# Patient Record
Sex: FEMALE | Race: OTHER | Employment: FULL TIME | URBAN - METROPOLITAN AREA
[De-identification: names, ages, dates, MRNs, and addresses within clinical notes are randomized per-mention and may not be internally consistent; named-entity substitution may affect disease eponyms.]

---

## 2017-11-27 ENCOUNTER — HOSPITAL ENCOUNTER (EMERGENCY)
Facility: HOSPITAL | Age: 37
Discharge: HOME | End: 2017-11-27
Attending: EMERGENCY MEDICINE
Payer: MEDICAID

## 2017-11-27 VITALS
DIASTOLIC BLOOD PRESSURE: 70 MMHG | SYSTOLIC BLOOD PRESSURE: 119 MMHG | WEIGHT: 168 LBS | TEMPERATURE: 97 F | BODY MASS INDEX: 26.37 KG/M2 | OXYGEN SATURATION: 100 % | HEIGHT: 67 IN | HEART RATE: 84 BPM | RESPIRATION RATE: 19 BRPM

## 2017-11-27 DIAGNOSIS — L24.4 IRRITANT CONTACT DERMATITIS DUE TO DRUG IN CONTACT WITH SKIN: ICD-10-CM

## 2017-11-27 DIAGNOSIS — J00 OTHER ACUTE RHINITIS: Primary | ICD-10-CM

## 2017-11-27 PROCEDURE — 99282 EMERGENCY DEPT VISIT SF MDM: CPT

## 2017-11-27 RX ORDER — FLUTICASONE PROPIONATE 50 MCG
1 SPRAY, SUSPENSION (ML) NASAL 2 TIMES DAILY
Qty: 16 G | Refills: 0 | Status: SHIPPED | OUTPATIENT
Start: 2017-11-27 | End: 2018-11-01

## 2017-11-27 RX ORDER — DIAPER,BRIEF,INFANT-TODD,DISP
1 EACH MISCELLANEOUS DAILY
Qty: 28.4 G | Refills: 0 | Status: SHIPPED | OUTPATIENT
Start: 2017-11-27 | End: 2017-11-29

## 2017-11-27 NOTE — ED INITIAL ASSESSMENT (HPI)
Cough and nasal congestion x3 days, woke this AM with redness and swelling around nose. Denies difficulty breathing; pt speaking in complete sentences. Reports fevers yesterday.

## 2017-11-27 NOTE — ED PROVIDER NOTES
Patient Seen in: [facility] AND [facility] Emergency Department    History   Patient presents with:  Cough    Stated Complaint: cold    HPI    Healthy 49-year-old female with several days of URI symptoms nasal congestion using topical Afrin he applied the extern Clear breath sounds. Abdominal: Soft. There is no tenderness. There is no guarding. Musculoskeletal: Normal range of motion. No edema or tenderness. Neurological: Alert and oriented to person, place, and time. Skin: Skin is warm and dry.    Psychiatr

## 2018-02-02 ENCOUNTER — APPOINTMENT (OUTPATIENT)
Dept: CT IMAGING | Facility: HOSPITAL | Age: 38
End: 2018-02-02
Attending: NURSE PRACTITIONER
Payer: MEDICAID

## 2018-02-02 ENCOUNTER — HOSPITAL ENCOUNTER (EMERGENCY)
Facility: HOSPITAL | Age: 38
Discharge: HOME | End: 2018-02-02
Payer: MEDICAID

## 2018-02-02 VITALS
WEIGHT: 162 LBS | SYSTOLIC BLOOD PRESSURE: 114 MMHG | TEMPERATURE: 98 F | OXYGEN SATURATION: 100 % | HEIGHT: 67 IN | HEART RATE: 76 BPM | RESPIRATION RATE: 18 BRPM | BODY MASS INDEX: 25.43 KG/M2 | DIASTOLIC BLOOD PRESSURE: 81 MMHG

## 2018-02-02 DIAGNOSIS — M54.9 MILD BACK PAIN: ICD-10-CM

## 2018-02-02 DIAGNOSIS — K04.7 DENTAL ABSCESS: Primary | ICD-10-CM

## 2018-02-02 LAB
ALBUMIN SERPL BCP-MCNC: 4 G/DL (ref 3.5–4.8)
ALP SERPL-CCNC: 60 U/L (ref 32–100)
ALT SERPL-CCNC: 14 U/L (ref 14–54)
ANION GAP SERPL CALC-SCNC: 10 MMOL/L (ref 0–18)
AST SERPL-CCNC: 20 U/L (ref 15–41)
B-HCG UR QL: NEGATIVE
BASOPHILS # BLD: 0 K/UL (ref 0–0.2)
BASOPHILS NFR BLD: 1 %
BILIRUB DIRECT SERPL-MCNC: 0 MG/DL (ref 0–0.2)
BILIRUB SERPL-MCNC: 0.5 MG/DL (ref 0.3–1.2)
BILIRUB UR QL: NEGATIVE
BUN SERPL-MCNC: 8 MG/DL (ref 8–20)
BUN/CREAT SERPL: 13.3 (ref 10–20)
CALCIUM SERPL-MCNC: 8.5 MG/DL (ref 8.5–10.5)
CHLORIDE SERPL-SCNC: 105 MMOL/L (ref 95–110)
CLARITY UR: CLEAR
CO2 SERPL-SCNC: 22 MMOL/L (ref 22–32)
COLOR UR: YELLOW
CREAT SERPL-MCNC: 0.6 MG/DL (ref 0.5–1.5)
EOSINOPHIL # BLD: 0.1 K/UL (ref 0–0.7)
EOSINOPHIL NFR BLD: 1 %
ERYTHROCYTE [DISTWIDTH] IN BLOOD BY AUTOMATED COUNT: 19.3 % (ref 11–15)
GLUCOSE SERPL-MCNC: 91 MG/DL (ref 70–99)
GLUCOSE UR-MCNC: NEGATIVE MG/DL
HCT VFR BLD AUTO: 26.7 % (ref 35–48)
HGB BLD-MCNC: 8 G/DL (ref 12–16)
HGB UR QL STRIP.AUTO: NEGATIVE
KETONES UR-MCNC: NEGATIVE MG/DL
LEUKOCYTE ESTERASE UR QL STRIP.AUTO: NEGATIVE
LIPASE SERPL-CCNC: 17 U/L (ref 22–51)
LYMPHOCYTES # BLD: 1.3 K/UL (ref 1–4)
LYMPHOCYTES NFR BLD: 16 %
MCH RBC QN AUTO: 18 PG (ref 27–32)
MCHC RBC AUTO-ENTMCNC: 29.9 G/DL (ref 32–37)
MCV RBC AUTO: 60.1 FL (ref 80–100)
MONOCYTES # BLD: 0.6 K/UL (ref 0–1)
MONOCYTES NFR BLD: 8 %
NEUTROPHILS # BLD AUTO: 6.2 K/UL (ref 1.8–7.7)
NEUTROPHILS NFR BLD: 75 %
NITRITE UR QL STRIP.AUTO: NEGATIVE
OSMOLALITY UR CALC.SUM OF ELEC: 282 MOSM/KG (ref 275–295)
PH UR: 6 [PH] (ref 5–8)
PLATELET # BLD AUTO: 337 K/UL (ref 140–400)
PMV BLD AUTO: 8.9 FL (ref 7.4–10.3)
POTASSIUM SERPL-SCNC: 3.6 MMOL/L (ref 3.3–5.1)
PROT SERPL-MCNC: 7.7 G/DL (ref 5.9–8.4)
PROT UR-MCNC: NEGATIVE MG/DL
RBC # BLD AUTO: 4.43 M/UL (ref 3.7–5.4)
SODIUM SERPL-SCNC: 137 MMOL/L (ref 136–144)
SP GR UR STRIP: 1.01 (ref 1–1.03)
UROBILINOGEN UR STRIP-ACNC: <2
VIT C UR-MCNC: NEGATIVE MG/DL
WBC # BLD AUTO: 8.2 K/UL (ref 4–11)

## 2018-02-02 PROCEDURE — 80076 HEPATIC FUNCTION PANEL: CPT | Performed by: EMERGENCY MEDICINE

## 2018-02-02 PROCEDURE — 81003 URINALYSIS AUTO W/O SCOPE: CPT | Performed by: NURSE PRACTITIONER

## 2018-02-02 PROCEDURE — 99284 EMERGENCY DEPT VISIT MOD MDM: CPT

## 2018-02-02 PROCEDURE — 96360 HYDRATION IV INFUSION INIT: CPT

## 2018-02-02 PROCEDURE — 83690 ASSAY OF LIPASE: CPT | Performed by: EMERGENCY MEDICINE

## 2018-02-02 PROCEDURE — 85025 COMPLETE CBC W/AUTO DIFF WBC: CPT | Performed by: NURSE PRACTITIONER

## 2018-02-02 PROCEDURE — 81025 URINE PREGNANCY TEST: CPT

## 2018-02-02 PROCEDURE — 80048 BASIC METABOLIC PNL TOTAL CA: CPT | Performed by: NURSE PRACTITIONER

## 2018-02-02 RX ORDER — IBUPROFEN 600 MG/1
600 TABLET ORAL ONCE
Status: COMPLETED | OUTPATIENT
Start: 2018-02-02 | End: 2018-02-02

## 2018-02-02 RX ORDER — AMOXICILLIN AND CLAVULANATE POTASSIUM 875; 125 MG/1; MG/1
1 TABLET, FILM COATED ORAL 2 TIMES DAILY
Qty: 20 TABLET | Refills: 0 | Status: SHIPPED | OUTPATIENT
Start: 2018-02-02 | End: 2018-02-12

## 2018-02-02 NOTE — ED PROVIDER NOTES
Patient Seen in: [facility] AND [facility] Emergency Department    History   CC: facial pain and flank pain  HPI: Tito Limat 42year old female with hx cholecystectomy 3 years ago and anemia for which she takes Fe bid who presents to the ER c/o left-sided c General - Appears well, non-toxic and in NAD  Head - +  Mild swelling to the left cheek and lower jawline without  associated overlying erythema. Scalp is symmetrical and nontender.    Eyes - PERRL, sclera not injected, no discharge noted, no perio Abnormality         Status                     ---------                               -----------         ------                     CBC W/ DIFFERENTIAL[[id number]]          Abnormal            Final result                 Please view results for

## 2018-02-02 NOTE — ED INITIAL ASSESSMENT (HPI)
Pt states swelling to left side of face, lower right back pain, and fever this AM. Took benadryl at 0700 this AM for swelling.

## 2018-04-20 ENCOUNTER — HOSPITAL ENCOUNTER (EMERGENCY)
Facility: HOSPITAL | Age: 38
Discharge: HOME | End: 2018-04-20
Attending: EMERGENCY MEDICINE
Payer: MEDICAID

## 2018-04-20 VITALS
HEIGHT: 67 IN | TEMPERATURE: 99 F | OXYGEN SATURATION: 100 % | HEART RATE: 82 BPM | BODY MASS INDEX: 25.9 KG/M2 | DIASTOLIC BLOOD PRESSURE: 84 MMHG | WEIGHT: 165 LBS | SYSTOLIC BLOOD PRESSURE: 124 MMHG | RESPIRATION RATE: 17 BRPM

## 2018-04-20 DIAGNOSIS — K04.7 DENTAL ABSCESS: Primary | ICD-10-CM

## 2018-04-20 PROCEDURE — 99283 EMERGENCY DEPT VISIT LOW MDM: CPT

## 2018-04-20 RX ORDER — CLINDAMYCIN HYDROCHLORIDE 300 MG/1
300 CAPSULE ORAL 4 TIMES DAILY
Qty: 30 CAPSULE | Refills: 0 | Status: SHIPPED | OUTPATIENT
Start: 2018-04-20 | End: 2018-04-30

## 2018-04-20 NOTE — ED NOTES
Pt to ED c/o left facial swelling starting this morning after having a filling removed by the dentist yesterday. Pt c/o pain to left cheek and swelling and pain to left eyelid. Denies fever/chills.

## 2018-04-20 NOTE — ED PROVIDER NOTES
Patient Seen in: [facility] AND [facility] Emergency Department    History   Patient presents with:   Eye Visual Problem (opthalmic)    Stated Complaint: left eye swelling    HPI    78-year-old female without significant past medical history presents with complai noted. No trismus. Poor dentition throughout. There is tenderness to percussion to the left, maxillary first molar. No gingival swelling or abscess noted.   Respiratory: there are no retractions, lungs are clear to auscultation  Cardiovascular: regular

## 2019-03-29 ENCOUNTER — OFFICE VISIT (OUTPATIENT)
Dept: FAMILY MEDICINE CLINIC | Facility: CLINIC | Age: 39
End: 2019-03-29
Payer: COMMERCIAL

## 2019-03-29 ENCOUNTER — TELEPHONE (OUTPATIENT)
Dept: FAMILY MEDICINE CLINIC | Facility: CLINIC | Age: 39
End: 2019-03-29

## 2019-03-29 VITALS
DIASTOLIC BLOOD PRESSURE: 80 MMHG | SYSTOLIC BLOOD PRESSURE: 110 MMHG | OXYGEN SATURATION: 96 % | WEIGHT: 155 LBS | BODY MASS INDEX: 24.33 KG/M2 | HEIGHT: 67 IN | RESPIRATION RATE: 12 BRPM | HEART RATE: 60 BPM

## 2019-03-29 DIAGNOSIS — M54.50 ACUTE BILATERAL LOW BACK PAIN WITHOUT SCIATICA: Primary | ICD-10-CM

## 2019-03-29 PROCEDURE — 99203 OFFICE O/P NEW LOW 30 MIN: CPT | Performed by: FAMILY MEDICINE

## 2019-03-29 RX ORDER — CYCLOBENZAPRINE HCL 5 MG
5 TABLET ORAL NIGHTLY PRN
Qty: 10 TABLET | Refills: 0 | Status: SHIPPED | OUTPATIENT
Start: 2019-03-29 | End: 2019-04-08

## 2019-03-29 RX ORDER — METHYLPREDNISOLONE 4 MG/1
TABLET ORAL
Qty: 1 KIT | Refills: 0 | Status: SHIPPED | OUTPATIENT
Start: 2019-03-29 | End: 2019-05-31

## 2019-03-29 RX ORDER — NAPROXEN 500 MG/1
500 TABLET ORAL 2 TIMES DAILY WITH MEALS
Qty: 20 TABLET | Refills: 0 | Status: SHIPPED | OUTPATIENT
Start: 2019-03-29 | End: 2019-04-08

## 2019-03-29 NOTE — TELEPHONE ENCOUNTER
Received fax from [facility] regarding Medical Release form that was sent. There were no records found from [employer].

## 2019-03-29 NOTE — TELEPHONE ENCOUNTER
Faxed over Release of Medical Records Request to [address] [place], [place]. [address]. Requesting records of last colonoscopy & ER Reports. DOS 2/23/2016 to present.

## 2019-03-29 NOTE — TELEPHONE ENCOUNTER
Faxed over Release of Medical Records Request to [facility] @ [address] [address] [address] [place] [address]. [phone]. Fax #[phone]. Pt requesting complete records from [address] [place] [address] Drive 02/26/2016 to present.

## 2019-04-03 NOTE — PROGRESS NOTES
HPI:   Patient presents with:  Low Back Pain: New pt c/c low backl pain x1 month  Other: last mammogram done 5yrs ago at [facility] last pap 2016      Abbi Beltran is a 37year old female presenting for:  Back Pain  · Pain is located at right lower side AST 20 15 - 41 U/L    ALT 14 14 - 54 U/L    Alkaline Phosphatase 60 32 - 100 U/L    Bilirubin, Total 0.5 0.3 - 1.2 mg/dL    Total Protein 7.7 5.9 - 8.4 g/dL    Albumin 4.0 3.5 - 4.8 g/dL    Bilirubin, Direct 0.0 0.0 - 0.2 mg/dL   LIPASE   Result Value Ref AST 20 02/02/2018 12:59 PM    ALT 14 02/02/2018 12:59 PM    BILT 0.5 02/02/2018 12:59 PM          Medications:    Current Outpatient Medications:  Cyclobenzaprine HCl 5 MG Oral Tab Take 1 tablet (5 mg total) by mouth nightly as needed for Muscle spasms.  Mandeep Memory 155 lb. Wt Readings from Last 3 Encounters:  03/29/19 : 155 lb  04/20/18 : 165 lb  02/02/18 : 162 lb      Physical Exam   Constitutional: She appears well-developed and well-nourished. No distress.    HENT:   Mouth/Throat: Oropharynx is clear and moist. precautions discussed. Meds & Refills for this Visit:  Requested Prescriptions     Signed Prescriptions Disp Refills   • Cyclobenzaprine HCl 5 MG Oral Tab 10 tablet 0     Sig: Take 1 tablet (5 mg total) by mouth nightly as needed for Muscle spasms.    •

## 2019-05-31 ENCOUNTER — OFFICE VISIT (OUTPATIENT)
Dept: FAMILY MEDICINE CLINIC | Facility: CLINIC | Age: 39
End: 2019-05-31
Payer: COMMERCIAL

## 2019-05-31 VITALS
SYSTOLIC BLOOD PRESSURE: 122 MMHG | HEART RATE: 88 BPM | HEIGHT: 67 IN | WEIGHT: 180 LBS | RESPIRATION RATE: 12 BRPM | BODY MASS INDEX: 28.25 KG/M2 | DIASTOLIC BLOOD PRESSURE: 74 MMHG | OXYGEN SATURATION: 99 %

## 2019-05-31 DIAGNOSIS — Z12.39 BREAST CANCER SCREENING: ICD-10-CM

## 2019-05-31 DIAGNOSIS — Z13.6 SCREENING FOR CARDIOVASCULAR CONDITION: ICD-10-CM

## 2019-05-31 DIAGNOSIS — Z00.00 WELLNESS EXAMINATION: Primary | ICD-10-CM

## 2019-05-31 DIAGNOSIS — Z00.00 HEALTHCARE MAINTENANCE: ICD-10-CM

## 2019-05-31 DIAGNOSIS — Z12.4 PAP SMEAR FOR CERVICAL CANCER SCREENING: ICD-10-CM

## 2019-05-31 DIAGNOSIS — M54.50 ACUTE BILATERAL LOW BACK PAIN WITHOUT SCIATICA: ICD-10-CM

## 2019-05-31 PROCEDURE — 88175 CYTOPATH C/V AUTO FLUID REDO: CPT | Performed by: FAMILY MEDICINE

## 2019-05-31 PROCEDURE — 99396 PREV VISIT EST AGE 40-64: CPT | Performed by: FAMILY MEDICINE

## 2019-05-31 PROCEDURE — 87624 HPV HI-RISK TYP POOLED RSLT: CPT | Performed by: FAMILY MEDICINE

## 2019-05-31 RX ORDER — CYCLOBENZAPRINE HCL 5 MG
5 TABLET ORAL NIGHTLY
Qty: 7 TABLET | Refills: 1 | Status: SHIPPED | OUTPATIENT
Start: 2019-05-31 | End: 2019-06-07

## 2019-05-31 NOTE — PROGRESS NOTES
CC: Annual Physical Exam    HPI:   Hyacinth Resendiz is a 37year old female who presents for a complete physical exam.    HCM  -Diet:  Well-balanced.   -Exercise started regularly  -Mental Health: denies any depression or anxiety sx  -Skin care:  no concern PROTEIN, TOTAL 7.6 6.1 - 8.1 g/dL    ALBUMIN 4.5 3.6 - 5.1 g/dL    GLOBULIN 3.1 1.9 - 3.7 g/dL (calc)    ALBUMIN/GLOBULIN RATIO 1.5 1.0 - 2.5 (calc)    BILIRUBIN, TOTAL 0.3 0.2 - 1.2 mg/dL    ALKALINE PHOSPHATASE 74 33 - 115 U/L    AST 14 10 - 30 U/L    AL intraepithelial lesion or malignancy       HPV High Risk mRNA       Negative  Normal        Recommendations/Comments       Screened by the [address] [place] [address] and a cytotechnologist.      Embedded Images      Procedure       Monolayers:  1      Clinica lesion  CARDIOVASCULAR:  Denies chest pain, chest pressure, chest discomfort, palpitations, edema, dyspnea on exertion or at rest.  RESPIRATORY:  Denies shortness of breath, wheezing, cough   GASTROINTESTINAL:  Denies abdominal pain, nausea, vomiting, cons physical exam.      Health maintenance, will check : Orders Placed This Encounter      CBC, Platelet, No Differential [E]      Comp Metabolic Panel (14) [E]      Lipid Panel [E]      TSH W Reflex To Free T4 [E]      Urinalysis, Routine [E][If pt <2 yrs old

## 2019-06-02 DIAGNOSIS — D50.9 IRON DEFICIENCY ANEMIA, UNSPECIFIED IRON DEFICIENCY ANEMIA TYPE: Primary | ICD-10-CM

## 2019-06-02 RX ORDER — FERROUS SULFATE 325(65) MG
325 TABLET ORAL 2 TIMES DAILY WITH MEALS
Qty: 180 TABLET | Refills: 0 | Status: SHIPPED | OUTPATIENT
Start: 2019-06-02 | End: 2020-10-07 | Stop reason: ALTCHOICE

## 2019-06-06 ENCOUNTER — APPOINTMENT (OUTPATIENT)
Dept: CT IMAGING | Facility: HOSPITAL | Age: 39
End: 2019-06-06
Attending: EMERGENCY MEDICINE
Payer: COMMERCIAL

## 2019-06-06 ENCOUNTER — HOSPITAL ENCOUNTER (EMERGENCY)
Facility: HOSPITAL | Age: 39
Discharge: HOME | End: 2019-06-06
Attending: EMERGENCY MEDICINE
Payer: COMMERCIAL

## 2019-06-06 VITALS
RESPIRATION RATE: 16 BRPM | HEART RATE: 75 BPM | HEIGHT: 67 IN | SYSTOLIC BLOOD PRESSURE: 109 MMHG | BODY MASS INDEX: 28.25 KG/M2 | TEMPERATURE: 98 F | OXYGEN SATURATION: 98 % | DIASTOLIC BLOOD PRESSURE: 75 MMHG | WEIGHT: 180 LBS

## 2019-06-06 DIAGNOSIS — D64.9 CHRONIC ANEMIA: ICD-10-CM

## 2019-06-06 DIAGNOSIS — S39.012A LUMBOSACRAL STRAIN, INITIAL ENCOUNTER: Primary | ICD-10-CM

## 2019-06-06 PROCEDURE — 81025 URINE PREGNANCY TEST: CPT

## 2019-06-06 PROCEDURE — 74176 CT ABD & PELVIS W/O CONTRAST: CPT | Performed by: EMERGENCY MEDICINE

## 2019-06-06 PROCEDURE — 99284 EMERGENCY DEPT VISIT MOD MDM: CPT

## 2019-06-06 PROCEDURE — 81003 URINALYSIS AUTO W/O SCOPE: CPT | Performed by: EMERGENCY MEDICINE

## 2019-06-06 RX ORDER — LIDOCAINE 50 MG/G
1 PATCH TOPICAL EVERY 24 HOURS
Qty: 6 PATCH | Refills: 0 | Status: SHIPPED | OUTPATIENT
Start: 2019-06-06 | End: 2019-06-12

## 2019-06-06 RX ORDER — HYDROCODONE BITARTRATE AND ACETAMINOPHEN 5; 325 MG/1; MG/1
1 TABLET ORAL EVERY 6 HOURS PRN
Qty: 10 TABLET | Refills: 0 | Status: SHIPPED | OUTPATIENT
Start: 2019-06-06 | End: 2020-10-07 | Stop reason: ALTCHOICE

## 2019-06-06 NOTE — ED NOTES
PT safe to DC home per MD. Aspen Cespedes to dress self. DC teaching done, pt verbalizes understanding. Ambulatory with steady gait to exit.

## 2019-06-06 NOTE — ED PROVIDER NOTES
Patient Seen in: [facility] Emergency Department    History   Patient presents with:  Back Pain (musculoskeletal)    Stated Complaint: back pain     HPI    49-year-old female now with about a week of diffuse mid to lower back pain not improving wi Neck: Normal range of motion. Neck supple. Cardiovascular: Normal rate, regular rhythm and intact distal pulses. Pulmonary/Chest: Effort normal. No respiratory distress. Abdominal: Soft. There is no tenderness. There is no guarding.    Musculoskele the intracardiac blood pool relative to the myocardium may relate to underlying anemia. There is dependent subsegmental atelectasis bilaterally.  LIVER: No enlargement, atrophy, abnormal density, or significant focal lesion is identified within the paramete pool raises the possibility of severe underlying anemia. Correlate with hematologic parameters. 4. Small right adnexal follicular cystic lesion. 5. Lesser incidental findings as above.     Dictated by (CST): Jade Zavala MD on 6/06/2019 at 7:47     Blanca

## 2019-06-06 NOTE — ED NOTES
Assumed care to this pt who came in ambulatory with steay gait from triage to room 32 with c/o of mid back pain radiating to lower back that's been going on for a Month, pt states that she saw her doctor for the same symptoms and was put on steroid and Fle

## 2019-07-19 ENCOUNTER — OFFICE VISIT (OUTPATIENT)
Dept: NEUROLOGY | Facility: CLINIC | Age: 39
End: 2019-07-19
Payer: COMMERCIAL

## 2019-07-19 VITALS
BODY MASS INDEX: 28.25 KG/M2 | HEART RATE: 82 BPM | WEIGHT: 180 LBS | RESPIRATION RATE: 18 BRPM | DIASTOLIC BLOOD PRESSURE: 68 MMHG | SYSTOLIC BLOOD PRESSURE: 110 MMHG | HEIGHT: 67 IN

## 2019-07-19 DIAGNOSIS — M54.50 CHRONIC BILATERAL LOW BACK PAIN WITHOUT SCIATICA: Primary | ICD-10-CM

## 2019-07-19 DIAGNOSIS — G47.00 INSOMNIA, UNSPECIFIED TYPE: ICD-10-CM

## 2019-07-19 DIAGNOSIS — G89.29 CHRONIC BILATERAL LOW BACK PAIN WITHOUT SCIATICA: Primary | ICD-10-CM

## 2019-07-19 PROCEDURE — 99244 OFF/OP CNSLTJ NEW/EST MOD 40: CPT | Performed by: PHYSICAL MEDICINE & REHABILITATION

## 2019-07-19 RX ORDER — CYCLOBENZAPRINE HCL 10 MG
10 TABLET ORAL NIGHTLY
Qty: 30 TABLET | Refills: 0 | Status: SHIPPED | OUTPATIENT
Start: 2019-07-19 | End: 2019-08-18

## 2019-07-19 RX ORDER — MELOXICAM 15 MG/1
15 TABLET ORAL DAILY
Qty: 30 TABLET | Refills: 0 | Status: SHIPPED | OUTPATIENT
Start: 2019-07-19 | End: 2020-10-07 | Stop reason: ALTCHOICE

## 2019-07-19 NOTE — PROGRESS NOTES
[address]  Progress Note    CHIEF COMPLAINT:  Patient presents with:  Low Back Pain: New patient here for localized constant low back pain for about 2 months.  patient was taking flexeril 5 MG with some reli MEDICATIONS:     Current Outpatient Medications:  Meloxicam 15 MG Oral Tab Take 1 tablet (15 mg total) by mouth daily. Disp: 30 tablet Rfl: 0   Cyclobenzaprine HCl 10 MG Oral Tab Take 1 tablet (10 mg total) by mouth nightly.  Disp: 30 tablet Rfl: 0   HYDROc kg/m². General: No immediate distress  Head: Normocephalic/ Atraumatic  Eyes: Extra-occular movements intact. Ears: No auricular hematoma or deformities  Heart: peripheral pulses intact. Normal capillary refill.    Lungs: Non-labored respirations  Ex

## 2019-07-20 PROBLEM — M54.50 CHRONIC BILATERAL LOW BACK PAIN WITHOUT SCIATICA: Status: ACTIVE | Noted: 2019-07-20

## 2019-07-20 PROBLEM — G89.29 CHRONIC BILATERAL LOW BACK PAIN WITHOUT SCIATICA: Status: ACTIVE | Noted: 2019-07-20

## 2019-07-24 ENCOUNTER — MED REC SCAN ONLY (OUTPATIENT)
Dept: NEUROLOGY | Facility: CLINIC | Age: 39
End: 2019-07-24

## 2020-10-07 ENCOUNTER — OFFICE VISIT (OUTPATIENT)
Dept: FAMILY MEDICINE CLINIC | Facility: CLINIC | Age: 40
End: 2020-10-07
Payer: COMMERCIAL

## 2020-10-07 ENCOUNTER — HOSPITAL ENCOUNTER (OUTPATIENT)
Dept: GENERAL RADIOLOGY | Facility: HOSPITAL | Age: 40
Discharge: HOME | End: 2020-10-07
Attending: INTERNAL MEDICINE
Payer: COMMERCIAL

## 2020-10-07 VITALS
OXYGEN SATURATION: 99 % | SYSTOLIC BLOOD PRESSURE: 88 MMHG | BODY MASS INDEX: 28.41 KG/M2 | WEIGHT: 181 LBS | DIASTOLIC BLOOD PRESSURE: 64 MMHG | HEIGHT: 67 IN | HEART RATE: 80 BPM

## 2020-10-07 DIAGNOSIS — M54.50 LUMBAR PAIN: Primary | ICD-10-CM

## 2020-10-07 DIAGNOSIS — M54.50 LUMBAR PAIN: ICD-10-CM

## 2020-10-07 DIAGNOSIS — K59.00 CONSTIPATION, UNSPECIFIED CONSTIPATION TYPE: ICD-10-CM

## 2020-10-07 PROCEDURE — 3074F SYST BP LT 130 MM HG: CPT | Performed by: INTERNAL MEDICINE

## 2020-10-07 PROCEDURE — 3008F BODY MASS INDEX DOCD: CPT | Performed by: INTERNAL MEDICINE

## 2020-10-07 PROCEDURE — 99214 OFFICE O/P EST MOD 30 MIN: CPT | Performed by: INTERNAL MEDICINE

## 2020-10-07 PROCEDURE — 3078F DIAST BP <80 MM HG: CPT | Performed by: INTERNAL MEDICINE

## 2020-10-07 PROCEDURE — 72110 X-RAY EXAM L-2 SPINE 4/>VWS: CPT | Performed by: INTERNAL MEDICINE

## 2020-10-07 RX ORDER — SENNA PLUS 8.6 MG/1
1 TABLET ORAL DAILY
Qty: 60 TABLET | Refills: 1 | Status: SHIPPED | OUTPATIENT
Start: 2020-10-07 | End: 2021-01-05

## 2020-10-07 RX ORDER — CYCLOBENZAPRINE HCL 10 MG
10 TABLET ORAL 3 TIMES DAILY
Qty: 30 TABLET | Refills: 1 | Status: SHIPPED | OUTPATIENT
Start: 2020-10-07 | End: 2020-11-06

## 2020-10-07 RX ORDER — NAPROXEN 500 MG/1
500 TABLET ORAL 2 TIMES DAILY WITH MEALS
Qty: 60 TABLET | Refills: 1 | Status: SHIPPED | OUTPATIENT
Start: 2020-10-07 | End: 2021-12-27

## 2020-10-07 NOTE — PROGRESS NOTES
[facility]  Return Patient Progress Note      HPI:   Patient presents with:  Back Pain: More to the Sheryle Nicolette is a 41year old female presenting for:back pain that radiates to abdomen.       Has a significant  has a past TP 7.6 05/31/2019 10:21 AM    ALB 4.5 05/31/2019 10:21 AM    ALKPHO 74 05/31/2019 10:21 AM    AST 14 05/31/2019 10:21 AM    ALT 10 05/31/2019 10:21 AM    BILT 0.3 05/31/2019 10:21 AM        Hemoglobin A1C, Microalbumin  No results found for: A1C     Lip disturbance. Respiratory: Negative for apnea, cough, chest tightness, shortness of breath and wheezing. Cardiovascular: Negative for chest pain, palpitations and leg swelling.    Gastrointestinal: Positive for abdominal pain, constipation and abdominal Cardiovascular: Normal rate, regular rhythm, S1 normal, S2 normal, normal heart sounds and intact distal pulses. Exam reveals no gallop and no friction rub. No murmur heard. Edema not present.   Pulmonary/Chest: Effort normal and breath sounds lyn mg total) by mouth 3 (three) times daily. • naproxen 500 MG Oral Tab 60 tablet 1     Sig: Take 1 tablet (500 mg total) by mouth 2 (two) times daily with meals. No orders of the defined types were placed in this encounter.       Imaging & Consults:

## 2020-10-08 ENCOUNTER — TELEPHONE (OUTPATIENT)
Dept: FAMILY MEDICINE CLINIC | Facility: CLINIC | Age: 40
End: 2020-10-08

## 2020-10-08 NOTE — TELEPHONE ENCOUNTER
Patient is calling requesting a letter for work. She was seen yesterday for back pain. She states she had said no to the letter but went to work today and she could not take the pain and was send home.  Patient is requesting a note for work to return on Mon

## 2020-10-08 NOTE — TELEPHONE ENCOUNTER
Initial fax failed. Attempted to resend, with an addendum. Address on letterhead is the incorrect office information [facility] office). [place] [address] for [facility] address and info and re-faxed to [facility].

## 2020-10-08 NOTE — TELEPHONE ENCOUNTER
Spoke to patient. Dr. Katelynn Lauren authorized letter. Faxed letter to:    [place]   [phone]    Also retrievable via [address] Patient aware.

## 2020-10-22 ENCOUNTER — TELEPHONE (OUTPATIENT)
Dept: FAMILY MEDICINE CLINIC | Facility: CLINIC | Age: 40
End: 2020-10-22

## 2020-10-22 DIAGNOSIS — M54.50 LUMBAR PAIN: Primary | ICD-10-CM

## 2020-10-22 NOTE — TELEPHONE ENCOUNTER
LMTCB    ----- Message from Mitra Machado MD sent at 10/7/2020  5:21 PM CDT -----  Please inform patient that lumbar xray reveals mild levoxcolisiosis and minimal spondylosis.   She has likely had this for extended period of time which is the cause of h

## 2020-11-11 NOTE — TELEPHONE ENCOUNTER
Patient called back and results were discussed patient does wants to be referred out to PT. Referral entered, info mailed to patient.

## 2020-12-16 ENCOUNTER — OFFICE VISIT (OUTPATIENT)
Dept: PHYSICAL THERAPY | Age: 40
End: 2020-12-16
Attending: INTERNAL MEDICINE
Payer: COMMERCIAL

## 2020-12-16 ENCOUNTER — TELEPHONE (OUTPATIENT)
Dept: PHYSICAL THERAPY | Facility: HOSPITAL | Age: 40
End: 2020-12-16

## 2020-12-16 DIAGNOSIS — M54.50 LUMBAR PAIN: ICD-10-CM

## 2020-12-16 PROCEDURE — 97110 THERAPEUTIC EXERCISES: CPT | Performed by: PHYSICAL THERAPIST

## 2020-12-16 PROCEDURE — 97161 PT EVAL LOW COMPLEX 20 MIN: CPT | Performed by: PHYSICAL THERAPIST

## 2020-12-16 NOTE — PROGRESS NOTES
SPINE EVALUATION:   Referring Physician: Dr. Ilir Huddleston  Diagnosis: low back pain Date of Service: 12/16/2020     PATIENT SUMMARY   Albina Collins is a 41year old female who presents to therapy today with complaints of LBP starting ~ 1.5 years ago when sh should patient not make consistent progress/benefit from further evaluation. Pt and PT discussed evaluation findings, pathology, POC and HEP. Pt voiced understanding and performs HEP correctly without reported pain.  Skilled Physical Therapy is medically n Complexity decision making . PLAN OF CARE:    Goals: (to be met in 8 visits)   1. Patient to report independence with HEP to facilitate self management. 2. Patient to sleep 5 hours unawoken by LBP.    3. Patient to have 5/5 B gluteal/LE MMT for improved

## 2020-12-23 ENCOUNTER — OFFICE VISIT (OUTPATIENT)
Dept: PHYSICAL THERAPY | Age: 40
End: 2020-12-23
Attending: INTERNAL MEDICINE
Payer: COMMERCIAL

## 2020-12-23 DIAGNOSIS — M54.50 LUMBAR PAIN: ICD-10-CM

## 2020-12-23 PROCEDURE — 97110 THERAPEUTIC EXERCISES: CPT | Performed by: PHYSICAL THERAPIST

## 2020-12-23 PROCEDURE — 97140 MANUAL THERAPY 1/> REGIONS: CPT | Performed by: PHYSICAL THERAPIST

## 2020-12-23 NOTE — PROGRESS NOTES
ProgressSummary  Pt has attended 2 visits in Physical Therapy.      Dx: low back pain         Insurance (Authorized # of Visits):  8 visits through 11/2021     Authorizing Physician: Dr. Roberto Hirsch  Next MD visit: none scheduled  Fall Risk: standard have 5/5 B gluteal/LE MMT for improved lumbar stabilization and no greater than 5/10 c/o LBP. 4. Patient to demonstrate knowledge of proper bodymechanics and sitting posture in order to reduce risk for LBP.    5. Patient to demonstrate knowledge of suppor for your referral. If you have any questions, please contact me at Dept: [phone].     Sincerely,  Electronically signed by therapist: Candace Salgado PT     Physician's certification required:  Yes  Please co-sign or sign and return this letter via

## 2021-01-05 RX ORDER — SENNA PLUS 8.6 MG/1
1 TABLET ORAL DAILY
Qty: 60 TABLET | Refills: 1 | Status: SHIPPED | OUTPATIENT
Start: 2021-01-05

## 2021-01-06 ENCOUNTER — OFFICE VISIT (OUTPATIENT)
Dept: PHYSICAL THERAPY | Age: 41
End: 2021-01-06
Attending: INTERNAL MEDICINE
Payer: COMMERCIAL

## 2021-01-06 DIAGNOSIS — M54.50 LUMBAR PAIN: ICD-10-CM

## 2021-01-06 PROCEDURE — 97110 THERAPEUTIC EXERCISES: CPT | Performed by: PHYSICAL THERAPIST

## 2021-01-06 PROCEDURE — 97140 MANUAL THERAPY 1/> REGIONS: CPT | Performed by: PHYSICAL THERAPIST

## 2021-01-06 PROCEDURE — 97014 ELECTRIC STIMULATION THERAPY: CPT | Performed by: PHYSICAL THERAPIST

## 2021-01-06 NOTE — PROGRESS NOTES
Dx: low back pain         Insurance (Authorized # of Visits):  8 visits through 11/2021     Authorizing Physician: Dr. Germaine Mcdaniels  Next MD visit: none scheduled  Fall Risk: standard         Precautions: n/a             Subjective:  Low back was better whe mobilization x 5 reps; s/l hip abd; across body piriformis stretch/piriformis stretch/long sitting piriformis stretch x 20 sec each; bridge with pillow for adductor isometric x 5 reps  Prone press up x 3 reps -c/o, stopped  Supine LTR 5 x 5 sec each  Supin

## 2021-01-13 ENCOUNTER — OFFICE VISIT (OUTPATIENT)
Dept: PHYSICAL THERAPY | Age: 41
End: 2021-01-13
Attending: INTERNAL MEDICINE
Payer: COMMERCIAL

## 2021-01-13 DIAGNOSIS — M54.50 LUMBAR PAIN: ICD-10-CM

## 2021-01-13 PROCEDURE — 97140 MANUAL THERAPY 1/> REGIONS: CPT | Performed by: PHYSICAL THERAPIST

## 2021-01-13 PROCEDURE — 97110 THERAPEUTIC EXERCISES: CPT | Performed by: PHYSICAL THERAPIST

## 2021-01-13 NOTE — PROGRESS NOTES
Dx: low back pain         Insurance (Authorized # of Visits):  8 visits through 11/2021     Authorizing Physician: Dr. Ahmet Ortega  Next MD visit: none scheduled  Fall Risk: standard         Precautions: n/a             Subjective:  Have not had spasms sin review/practice: cat/cow spinal mobilization x 5 reps; s/l hip abd; across body piriformis stretch/piriformis stretch/long sitting piriformis stretch x 20 sec each; bridge with pillow for adductor isometric x 5 reps  Prone press up x 3 reps -c/o, stopped

## 2021-01-20 ENCOUNTER — OFFICE VISIT (OUTPATIENT)
Dept: PHYSICAL THERAPY | Age: 41
End: 2021-01-20
Attending: INTERNAL MEDICINE
Payer: COMMERCIAL

## 2021-01-20 DIAGNOSIS — M54.50 LUMBAR PAIN: ICD-10-CM

## 2021-01-20 PROCEDURE — 97110 THERAPEUTIC EXERCISES: CPT | Performed by: PHYSICAL THERAPIST

## 2021-01-20 PROCEDURE — 97140 MANUAL THERAPY 1/> REGIONS: CPT | Performed by: PHYSICAL THERAPIST

## 2021-01-20 NOTE — PROGRESS NOTES
Dx: low back pain         Insurance (Authorized # of Visits):  8 visits through 11/2021     Authorizing Physician: Dr. Alberto Magdaleno  Next MD visit: none scheduled  Fall Risk: standard         Precautions: n/a             Subjective:  C/o LB locking when in review/practice: cat/cow spinal mobilization x 5 reps; s/l hip abd; across body piriformis stretch/piriformis stretch/long sitting piriformis stretch x 20 sec each; bridge with pillow for adductor isometric x 5 reps  Prone press up x 3 reps -c/o, stopped STM R T/L spine junction musculature  L/R quadriceps stretch 2 x 30 sec each     IFC/MHP low back x 10 min supine with B knees flexted             HEP: quadruped cat/cow spinal mobilization, across body piriformis stretch - change to long sitting pirifor

## 2021-01-27 ENCOUNTER — APPOINTMENT (OUTPATIENT)
Dept: PHYSICAL THERAPY | Age: 41
End: 2021-01-27
Attending: INTERNAL MEDICINE
Payer: COMMERCIAL

## 2021-01-27 ENCOUNTER — TELEPHONE (OUTPATIENT)
Dept: PHYSICAL THERAPY | Facility: HOSPITAL | Age: 41
End: 2021-01-27

## 2021-02-03 ENCOUNTER — APPOINTMENT (OUTPATIENT)
Dept: PHYSICAL THERAPY | Age: 41
End: 2021-02-03
Attending: INTERNAL MEDICINE
Payer: COMMERCIAL

## 2021-02-10 ENCOUNTER — APPOINTMENT (OUTPATIENT)
Dept: PHYSICAL THERAPY | Age: 41
End: 2021-02-10
Attending: INTERNAL MEDICINE
Payer: COMMERCIAL

## 2021-02-17 ENCOUNTER — APPOINTMENT (OUTPATIENT)
Dept: PHYSICAL THERAPY | Age: 41
End: 2021-02-17
Attending: INTERNAL MEDICINE
Payer: COMMERCIAL

## 2021-08-19 RX ORDER — CYCLOBENZAPRINE HCL 10 MG
TABLET ORAL
Qty: 30 TABLET | Refills: 0 | OUTPATIENT
Start: 2021-08-19

## 2021-08-19 RX ORDER — NAPROXEN 500 MG/1
TABLET ORAL
Qty: 60 TABLET | Refills: 0 | OUTPATIENT
Start: 2021-08-19

## 2021-08-20 RX ORDER — CYCLOBENZAPRINE HCL 10 MG
TABLET ORAL
Qty: 30 TABLET | Refills: 0 | OUTPATIENT
Start: 2021-08-20

## 2021-08-20 RX ORDER — NAPROXEN 500 MG/1
TABLET ORAL
Qty: 60 TABLET | Refills: 0 | OUTPATIENT
Start: 2021-08-20

## 2021-12-21 ENCOUNTER — OFFICE VISIT (OUTPATIENT)
Dept: FAMILY MEDICINE CLINIC | Facility: CLINIC | Age: 41
End: 2021-12-21
Payer: COMMERCIAL

## 2021-12-21 VITALS
WEIGHT: 183 LBS | HEART RATE: 84 BPM | OXYGEN SATURATION: 100 % | HEIGHT: 67 IN | TEMPERATURE: 97 F | DIASTOLIC BLOOD PRESSURE: 62 MMHG | BODY MASS INDEX: 28.72 KG/M2 | SYSTOLIC BLOOD PRESSURE: 98 MMHG

## 2021-12-21 DIAGNOSIS — G89.29 CHRONIC BILATERAL THORACIC BACK PAIN: ICD-10-CM

## 2021-12-21 DIAGNOSIS — M54.6 CHRONIC BILATERAL THORACIC BACK PAIN: ICD-10-CM

## 2021-12-21 DIAGNOSIS — D50.9 IRON DEFICIENCY ANEMIA, UNSPECIFIED IRON DEFICIENCY ANEMIA TYPE: ICD-10-CM

## 2021-12-21 DIAGNOSIS — M54.50 LUMBAR BACK PAIN: Primary | ICD-10-CM

## 2021-12-21 DIAGNOSIS — Z12.31 SCREENING MAMMOGRAM FOR BREAST CANCER: ICD-10-CM

## 2021-12-21 PROCEDURE — 3078F DIAST BP <80 MM HG: CPT | Performed by: INTERNAL MEDICINE

## 2021-12-21 PROCEDURE — 90471 IMMUNIZATION ADMIN: CPT | Performed by: INTERNAL MEDICINE

## 2021-12-21 PROCEDURE — 3074F SYST BP LT 130 MM HG: CPT | Performed by: INTERNAL MEDICINE

## 2021-12-21 PROCEDURE — 90686 IIV4 VACC NO PRSV 0.5 ML IM: CPT | Performed by: INTERNAL MEDICINE

## 2021-12-21 PROCEDURE — 99214 OFFICE O/P EST MOD 30 MIN: CPT | Performed by: INTERNAL MEDICINE

## 2021-12-21 PROCEDURE — 3008F BODY MASS INDEX DOCD: CPT | Performed by: INTERNAL MEDICINE

## 2021-12-21 RX ORDER — MELOXICAM 15 MG/1
15 TABLET ORAL DAILY
Qty: 90 TABLET | Refills: 0 | Status: SHIPPED | OUTPATIENT
Start: 2021-12-21

## 2021-12-21 RX ORDER — CYCLOBENZAPRINE HCL 10 MG
10 TABLET ORAL NIGHTLY PRN
Qty: 60 TABLET | Refills: 1 | Status: SHIPPED | OUTPATIENT
Start: 2021-12-21 | End: 2022-01-20

## 2021-12-21 NOTE — PROGRESS NOTES
Patient came in today to get labs done, L antecubital was used to collect blood without complications. Specimen sent to the lab. Patient came in today to get the influenza vaccine. Left deltoid was used for the vaccine. Patient tolerated vaccine well.

## 2021-12-24 NOTE — PROGRESS NOTES
Discussed with patient her hemoglobin. Her iron studies indicate severe deficiency. Hemoglobin 6.7. She is asymptomatic. Discussed transfusion, however given asymptomatic and current holiday would like to hold off until Monday.   She will double up on fe

## 2021-12-24 NOTE — PROGRESS NOTES
[facility]  Return Patient Progress Note      HPI:   Patient presents with:   Follow - Up: Lumbar pain      Jimbo Chavez is a 35year old female presenting for:back pain   Has a significant  has a past medical history of Anemia and 0.5 0.2 - 1.2 mg/dL    ALKALINE PHOSPHATASE 76 31 - 125 U/L    AST 15 10 - 30 U/L    ALT 11 6 - 29 U/L   IRON AND TIBC   Result Value Ref Range    IRON, TOTAL 14 (L) 40 - 190 mcg/dL    IRON BINDING CAPACITY 521 (H) 250 - 450 mcg/dL (calc)    % SATURATION 3 Allergies:    Fish-Derived Produc*    RASH  Other                   RASH    Comment:Hui layton   Social History:  Social History    Tobacco Use      Smoking status: Never Smoker      Smokeless tobacco: Never Used    Alcohol use: No    Drug use: No Wt 183 lb (83 kg)   LMP 12/07/2021   SpO2 100%   BMI 28.66 kg/m²  Estimated body mass index is 28.66 kg/m² as calculated from the following:    Height as of this encounter: 5' 7\" (1.702 m). Weight as of this encounter: 183 lb (83 kg).    Wt Readings fro G89.29) Chronic bilateral thoracic back pain  Plan: MRI SPINE THORACIC (CPT=72146)            (Z12.31) Screening mammogram for breast cancer  Plan: [facility] SCREENING BILAT (CPT=77067)            (D50.9) Iron deficiency anemia, unspecified iron deficiency anemi

## 2021-12-27 ENCOUNTER — HOSPITAL ENCOUNTER (EMERGENCY)
Facility: HOSPITAL | Age: 41
Discharge: HOME | End: 2021-12-27
Attending: EMERGENCY MEDICINE
Payer: COMMERCIAL

## 2021-12-27 ENCOUNTER — OFFICE VISIT (OUTPATIENT)
Dept: FAMILY MEDICINE CLINIC | Facility: CLINIC | Age: 41
End: 2021-12-27
Payer: COMMERCIAL

## 2021-12-27 VITALS
HEART RATE: 74 BPM | OXYGEN SATURATION: 100 % | HEIGHT: 67 IN | SYSTOLIC BLOOD PRESSURE: 104 MMHG | TEMPERATURE: 98 F | DIASTOLIC BLOOD PRESSURE: 63 MMHG | RESPIRATION RATE: 16 BRPM | BODY MASS INDEX: 28.72 KG/M2 | WEIGHT: 183 LBS

## 2021-12-27 VITALS
BODY MASS INDEX: 29 KG/M2 | SYSTOLIC BLOOD PRESSURE: 98 MMHG | WEIGHT: 186 LBS | OXYGEN SATURATION: 100 % | HEART RATE: 97 BPM | DIASTOLIC BLOOD PRESSURE: 62 MMHG | TEMPERATURE: 97 F

## 2021-12-27 DIAGNOSIS — D64.9 ANEMIA, UNSPECIFIED TYPE: Primary | ICD-10-CM

## 2021-12-27 DIAGNOSIS — D50.9 IRON DEFICIENCY ANEMIA, UNSPECIFIED IRON DEFICIENCY ANEMIA TYPE: Primary | ICD-10-CM

## 2021-12-27 LAB
ANION GAP SERPL CALC-SCNC: 7 MMOL/L (ref 0–18)
ANTIBODY SCREEN: NEGATIVE
BASOPHILS # BLD AUTO: 0.05 X10(3) UL (ref 0–0.2)
BASOPHILS NFR BLD AUTO: 0.6 %
BILIRUB UR QL: NEGATIVE
BUN BLD-MCNC: 9 MG/DL (ref 7–18)
BUN/CREAT SERPL: 15.5 (ref 10–20)
CALCIUM BLD-MCNC: 9 MG/DL (ref 8.5–10.1)
CHLORIDE SERPL-SCNC: 110 MMOL/L (ref 98–112)
CLARITY UR: CLEAR
CO2 SERPL-SCNC: 21 MMOL/L (ref 21–32)
COLOR UR: YELLOW
CREAT BLD-MCNC: 0.58 MG/DL
DEPRECATED RDW RBC AUTO: 44.1 FL (ref 35.1–46.3)
EOSINOPHIL # BLD AUTO: 0.13 X10(3) UL (ref 0–0.7)
EOSINOPHIL NFR BLD AUTO: 1.6 %
ERYTHROCYTE [DISTWIDTH] IN BLOOD BY AUTOMATED COUNT: 22.5 % (ref 11–15)
GLUCOSE BLD-MCNC: 95 MG/DL (ref 70–99)
GLUCOSE UR-MCNC: NEGATIVE MG/DL
HCT VFR BLD AUTO: 25.7 %
HGB BLD-MCNC: 6.4 G/DL
HGB UR QL STRIP.AUTO: NEGATIVE
IMM GRANULOCYTES # BLD AUTO: 0.04 X10(3) UL (ref 0–1)
IMM GRANULOCYTES NFR BLD: 0.5 %
KETONES UR-MCNC: NEGATIVE MG/DL
LEUKOCYTE ESTERASE UR QL STRIP.AUTO: NEGATIVE
LYMPHOCYTES # BLD AUTO: 2.07 X10(3) UL (ref 1–4)
LYMPHOCYTES NFR BLD AUTO: 25.3 %
MCH RBC QN AUTO: 15 PG (ref 26–34)
MCHC RBC AUTO-ENTMCNC: 24.9 G/DL (ref 31–37)
MCV RBC AUTO: 60.2 FL
MONOCYTES # BLD AUTO: 0.58 X10(3) UL (ref 0.1–1)
MONOCYTES NFR BLD AUTO: 7.1 %
NEUTROPHILS # BLD AUTO: 5.3 X10 (3) UL (ref 1.5–7.7)
NEUTROPHILS # BLD AUTO: 5.3 X10(3) UL (ref 1.5–7.7)
NEUTROPHILS NFR BLD AUTO: 64.9 %
NITRITE UR QL STRIP.AUTO: NEGATIVE
OSMOLALITY SERPL CALC.SUM OF ELEC: 284 MOSM/KG (ref 275–295)
PH UR: 5 [PH] (ref 5–8)
PLATELET # BLD AUTO: 401 10(3)UL (ref 150–450)
PLATELET MORPHOLOGY: NORMAL
POTASSIUM SERPL-SCNC: 4 MMOL/L (ref 3.5–5.1)
PROT UR-MCNC: NEGATIVE MG/DL
RBC # BLD AUTO: 4.27 X10(6)UL
RH BLOOD TYPE: POSITIVE
SARS-COV-2 RNA RESP QL NAA+PROBE: NOT DETECTED
SODIUM SERPL-SCNC: 138 MMOL/L (ref 136–145)
SP GR UR STRIP: 1.02 (ref 1–1.03)
UROBILINOGEN UR STRIP-ACNC: <2
WBC # BLD AUTO: 8.2 X10(3) UL (ref 4–11)

## 2021-12-27 PROCEDURE — 36430 TRANSFUSION BLD/BLD COMPNT: CPT

## 2021-12-27 PROCEDURE — 3074F SYST BP LT 130 MM HG: CPT | Performed by: INTERNAL MEDICINE

## 2021-12-27 PROCEDURE — 85025 COMPLETE CBC W/AUTO DIFF WBC: CPT | Performed by: EMERGENCY MEDICINE

## 2021-12-27 PROCEDURE — 80048 BASIC METABOLIC PNL TOTAL CA: CPT | Performed by: EMERGENCY MEDICINE

## 2021-12-27 PROCEDURE — 99215 OFFICE O/P EST HI 40 MIN: CPT | Performed by: INTERNAL MEDICINE

## 2021-12-27 PROCEDURE — 86920 COMPATIBILITY TEST SPIN: CPT

## 2021-12-27 PROCEDURE — 3078F DIAST BP <80 MM HG: CPT | Performed by: INTERNAL MEDICINE

## 2021-12-27 PROCEDURE — 86901 BLOOD TYPING SEROLOGIC RH(D): CPT | Performed by: EMERGENCY MEDICINE

## 2021-12-27 PROCEDURE — 86900 BLOOD TYPING SEROLOGIC ABO: CPT | Performed by: EMERGENCY MEDICINE

## 2021-12-27 PROCEDURE — 86850 RBC ANTIBODY SCREEN: CPT | Performed by: EMERGENCY MEDICINE

## 2021-12-27 PROCEDURE — 81003 URINALYSIS AUTO W/O SCOPE: CPT | Performed by: EMERGENCY MEDICINE

## 2021-12-27 PROCEDURE — 99285 EMERGENCY DEPT VISIT HI MDM: CPT

## 2021-12-27 RX ORDER — FERROUS SULFATE 325(65) MG
325 TABLET ORAL
Qty: 60 TABLET | Refills: 3 | Status: SHIPPED | OUTPATIENT
Start: 2021-12-27

## 2021-12-27 RX ORDER — MELATONIN
325 2 TIMES DAILY WITH MEALS
COMMUNITY

## 2021-12-27 NOTE — ED PROVIDER NOTES
Patient Seen in: [facility] AND [facility] Emergency Department      History   Patient presents with:  Anemia    Stated Complaint: sent by dr Whitney Pickard for blood transfusion  hemoglobin 6.7    Subjective:   HPI    Patient presents the emergency department after b Constitutional:       General: She is not in acute distress. Appearance: She is well-developed. HENT:      Head: Normocephalic. Nose: Nose normal.      Mouth/Throat:      Mouth: Mucous membranes are moist.   Eyes:      Conjunctiva/sclera:  Conj CBC W/ DIFFERENTIAL[[id number]]          Abnormal            Final result                 Please view results for these tests on the individual orders. URINALYSIS WITH CULTURE REFLEX   TYPE AND SCREEN    Narrative:      The following orders were [id number] [phone]    Schedule an appointment as soon as possible for a visit            Medications Prescribed:  Discharge Medication List as of 12/27/2021  4:50 PM

## 2021-12-27 NOTE — ED INITIAL ASSESSMENT (HPI)
Patient arrives alone with report that Dr Dontae Estes (PCP) directed her to the ED for hgb for 6.7.  Patient has been diagnosed with iron deficiency anemia but has never needed blood transfusion before  Denies any current bleeding but 12/7/2021 had heavy

## 2021-12-27 NOTE — ED QUICK NOTES
Pt ambulated out with steady gait, denies any c/o at this time.  Pt verbalized understanding of verbal/written inst inst sheets given

## 2021-12-28 LAB — BLOOD TYPE BARCODE: 6200

## 2021-12-30 NOTE — PROGRESS NOTES
[facility]  Return Patient Progress Note      HPI:   Patient presents with:  Test Results      Ge Torres is a 35year old female presenting for follow up  Has a significant  has a past medical history of Anemia, Hypothyroidism, Positive    Antibody Screen   Result Value Ref Range    Antibody Screen Negative    Prepare RBC Once   Result Value Ref Range    Blood Product [id number]     Unit Number [id number]     UNIT ABO A     UNIT RH POS     Product Status Presumed Transfused 05/31/2019 10:21 AM    TRIG 96 05/31/2019 10:21 AM     (H) 05/31/2019 10:21 AM    NONHDLC 122 05/31/2019 10:21 AM        Medications:  Current Outpatient Medications   Medication Sig Dispense Refill   • ferrous sulfate 325 (65 FE) MG Oral Tab EC Sergei chest tightness, shortness of breath and wheezing. Cardiovascular: Negative for chest pain, palpitations and leg swelling. Gastrointestinal: Negative for nausea, abdominal pain, constipation, blood in stool and abdominal distention.    Endocrine: Negat intact distal pulses. Exam reveals no gallop and no friction rub. No murmur heard. Edema not present. Pulmonary/Chest: Effort normal and breath sounds normal. No respiratory distress. She has no wheezes. She has no rales. She exhibits no tenderness. Back pain. Patient indicates understanding of the above recommendations and agrees to the above plan. Reasurrance and education provided. All questions answered.   Notified to call with any questions, complications, allergies, or worsening or changi

## 2022-03-30 ENCOUNTER — HOSPITAL ENCOUNTER (EMERGENCY)
Facility: HOSPITAL | Age: 42
Discharge: HOME | End: 2022-03-30
Attending: EMERGENCY MEDICINE
Payer: COMMERCIAL

## 2022-03-30 VITALS
OXYGEN SATURATION: 99 % | HEART RATE: 72 BPM | DIASTOLIC BLOOD PRESSURE: 87 MMHG | RESPIRATION RATE: 18 BRPM | TEMPERATURE: 98 F | SYSTOLIC BLOOD PRESSURE: 120 MMHG

## 2022-03-30 DIAGNOSIS — T30.0 THERMAL BURNS OF MULTIPLE SITES: Primary | ICD-10-CM

## 2022-03-30 PROCEDURE — 16000 INITIAL TREATMENT OF BURN(S): CPT

## 2022-03-30 PROCEDURE — 90471 IMMUNIZATION ADMIN: CPT

## 2022-03-30 PROCEDURE — 99283 EMERGENCY DEPT VISIT LOW MDM: CPT

## 2022-03-30 RX ORDER — HYDROCODONE BITARTRATE AND ACETAMINOPHEN 5; 325 MG/1; MG/1
1 TABLET ORAL EVERY 4 HOURS PRN
Qty: 10 TABLET | Refills: 0 | Status: SHIPPED | OUTPATIENT
Start: 2022-03-30
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
## 2022-03-31 ENCOUNTER — OFFICE VISIT (OUTPATIENT)
Dept: SURGERY | Facility: CLINIC | Age: 42
End: 2022-03-31
Payer: COMMERCIAL

## 2022-03-31 DIAGNOSIS — T20.20XA BURN OF FACE, SECOND DEGREE, INITIAL ENCOUNTER: Primary | ICD-10-CM

## 2022-03-31 DIAGNOSIS — T24.202A BURN OF LEFT LEG, SECOND DEGREE, INITIAL ENCOUNTER: ICD-10-CM

## 2022-03-31 PROCEDURE — 99204 OFFICE O/P NEW MOD 45 MIN: CPT | Performed by: PLASTIC SURGERY

## 2022-03-31 RX ORDER — HYDROCODONE BITARTRATE AND ACETAMINOPHEN 7.5; 325 MG/1; MG/1
TABLET ORAL
Qty: 25 TABLET | Refills: 0 | Status: SHIPPED | OUTPATIENT
Start: 2022-03-31

## 2022-04-11 ENCOUNTER — OFFICE VISIT (OUTPATIENT)
Dept: SURGERY | Facility: CLINIC | Age: 42
End: 2022-04-11
Payer: COMMERCIAL

## 2022-04-11 DIAGNOSIS — T24.202A BURN OF LEFT LEG, SECOND DEGREE, INITIAL ENCOUNTER: ICD-10-CM

## 2022-04-11 DIAGNOSIS — T20.20XA BURN OF FACE, SECOND DEGREE, INITIAL ENCOUNTER: Primary | ICD-10-CM

## 2022-04-11 PROCEDURE — 99213 OFFICE O/P EST LOW 20 MIN: CPT | Performed by: PLASTIC SURGERY

## 2022-09-07 ENCOUNTER — OFFICE VISIT (OUTPATIENT)
Dept: INTERNAL MEDICINE CLINIC | Facility: CLINIC | Age: 42
End: 2022-09-07
Payer: COMMERCIAL

## 2022-09-07 VITALS
TEMPERATURE: 97 F | SYSTOLIC BLOOD PRESSURE: 92 MMHG | OXYGEN SATURATION: 100 % | HEIGHT: 67 IN | BODY MASS INDEX: 28.56 KG/M2 | HEART RATE: 97 BPM | WEIGHT: 182 LBS | DIASTOLIC BLOOD PRESSURE: 62 MMHG

## 2022-09-07 DIAGNOSIS — Z00.00 ANNUAL PHYSICAL EXAM: Primary | ICD-10-CM

## 2022-09-07 DIAGNOSIS — N92.4 EXCESSIVE BLEEDING IN PREMENOPAUSAL PERIOD: ICD-10-CM

## 2022-09-07 DIAGNOSIS — Z12.4 PAP SMEAR FOR CERVICAL CANCER SCREENING: ICD-10-CM

## 2022-09-07 DIAGNOSIS — D28.0 VESTIBULAR PAPILLOMATOSIS: ICD-10-CM

## 2022-09-07 DIAGNOSIS — R82.90 CLOUDY URINE: ICD-10-CM

## 2022-09-07 DIAGNOSIS — Z11.3 SCREEN FOR STD (SEXUALLY TRANSMITTED DISEASE): ICD-10-CM

## 2022-09-07 DIAGNOSIS — N89.8 VAGINAL DISCHARGE: ICD-10-CM

## 2022-09-07 DIAGNOSIS — Z13.1 DIABETES MELLITUS SCREENING: ICD-10-CM

## 2022-09-07 DIAGNOSIS — D50.9 IRON DEFICIENCY ANEMIA, UNSPECIFIED IRON DEFICIENCY ANEMIA TYPE: ICD-10-CM

## 2022-09-07 LAB
BILIRUB UR QL: NEGATIVE
COLOR UR: YELLOW
DEPRECATED HBV CORE AB SER IA-ACNC: 29.5 NG/ML
GLUCOSE UR-MCNC: NEGATIVE MG/DL
HGB UR QL STRIP.AUTO: NEGATIVE
HYALINE CASTS #/AREA URNS AUTO: PRESENT /LPF
IRON SATN MFR SERPL: 11 %
IRON SERPL-MCNC: 53 UG/DL
LEUKOCYTE ESTERASE UR QL STRIP.AUTO: NEGATIVE
NITRITE UR QL STRIP.AUTO: NEGATIVE
PH UR: 5.5 [PH] (ref 5–8)
SP GR UR STRIP: >=1.03 (ref 1–1.03)
TIBC SERPL-MCNC: 501 UG/DL (ref 240–450)
TRANSFERRIN SERPL-MCNC: 336 MG/DL (ref 200–360)
UROBILINOGEN UR STRIP-ACNC: 0.2

## 2022-09-07 PROCEDURE — 81001 URINALYSIS AUTO W/SCOPE: CPT | Performed by: INTERNAL MEDICINE

## 2022-09-07 PROCEDURE — 83540 ASSAY OF IRON: CPT | Performed by: INTERNAL MEDICINE

## 2022-09-07 PROCEDURE — 84466 ASSAY OF TRANSFERRIN: CPT | Performed by: INTERNAL MEDICINE

## 2022-09-07 PROCEDURE — 80061 LIPID PANEL: CPT | Performed by: INTERNAL MEDICINE

## 2022-09-07 PROCEDURE — 87086 URINE CULTURE/COLONY COUNT: CPT | Performed by: INTERNAL MEDICINE

## 2022-09-07 PROCEDURE — 87808 TRICHOMONAS ASSAY W/OPTIC: CPT | Performed by: INTERNAL MEDICINE

## 2022-09-07 PROCEDURE — 87591 N.GONORRHOEAE DNA AMP PROB: CPT | Performed by: INTERNAL MEDICINE

## 2022-09-07 PROCEDURE — 80053 COMPREHEN METABOLIC PANEL: CPT | Performed by: INTERNAL MEDICINE

## 2022-09-07 PROCEDURE — 3078F DIAST BP <80 MM HG: CPT | Performed by: INTERNAL MEDICINE

## 2022-09-07 PROCEDURE — 87205 SMEAR GRAM STAIN: CPT | Performed by: INTERNAL MEDICINE

## 2022-09-07 PROCEDURE — 82728 ASSAY OF FERRITIN: CPT | Performed by: INTERNAL MEDICINE

## 2022-09-07 PROCEDURE — 99396 PREV VISIT EST AGE 40-64: CPT | Performed by: INTERNAL MEDICINE

## 2022-09-07 PROCEDURE — 87624 HPV HI-RISK TYP POOLED RSLT: CPT | Performed by: INTERNAL MEDICINE

## 2022-09-07 PROCEDURE — 3008F BODY MASS INDEX DOCD: CPT | Performed by: INTERNAL MEDICINE

## 2022-09-07 PROCEDURE — 87106 FUNGI IDENTIFICATION YEAST: CPT | Performed by: INTERNAL MEDICINE

## 2022-09-07 PROCEDURE — 85025 COMPLETE CBC W/AUTO DIFF WBC: CPT | Performed by: INTERNAL MEDICINE

## 2022-09-07 PROCEDURE — 3074F SYST BP LT 130 MM HG: CPT | Performed by: INTERNAL MEDICINE

## 2022-09-07 PROCEDURE — 99213 OFFICE O/P EST LOW 20 MIN: CPT | Performed by: INTERNAL MEDICINE

## 2022-09-07 PROCEDURE — 87389 HIV-1 AG W/HIV-1&-2 AB AG IA: CPT | Performed by: INTERNAL MEDICINE

## 2022-09-07 PROCEDURE — 83036 HEMOGLOBIN GLYCOSYLATED A1C: CPT | Performed by: INTERNAL MEDICINE

## 2022-09-07 PROCEDURE — 87491 CHLMYD TRACH DNA AMP PROBE: CPT | Performed by: INTERNAL MEDICINE

## 2022-09-08 LAB
ALBUMIN SERPL-MCNC: 4.1 G/DL (ref 3.4–5)
ALBUMIN/GLOB SERPL: 1.1 {RATIO} (ref 1–2)
ALP LIVER SERPL-CCNC: 86 U/L
ALT SERPL-CCNC: 27 U/L
ANION GAP SERPL CALC-SCNC: 12 MMOL/L (ref 0–18)
AST SERPL-CCNC: 18 U/L (ref 15–37)
BASOPHILS # BLD AUTO: 0.07 X10(3) UL (ref 0–0.2)
BASOPHILS NFR BLD AUTO: 1 %
BILIRUB SERPL-MCNC: 0.4 MG/DL (ref 0.1–2)
BUN BLD-MCNC: 15 MG/DL (ref 7–18)
BUN/CREAT SERPL: 23.8 (ref 10–20)
C TRACH DNA SPEC QL NAA+PROBE: NEGATIVE
CALCIUM BLD-MCNC: 9.3 MG/DL (ref 8.5–10.1)
CHLORIDE SERPL-SCNC: 108 MMOL/L (ref 98–112)
CHOLEST SERPL-MCNC: 195 MG/DL (ref ?–200)
CO2 SERPL-SCNC: 19 MMOL/L (ref 21–32)
CREAT BLD-MCNC: 0.63 MG/DL
DEPRECATED RDW RBC AUTO: 49.4 FL (ref 35.1–46.3)
EOSINOPHIL # BLD AUTO: 0.1 X10(3) UL (ref 0–0.7)
EOSINOPHIL NFR BLD AUTO: 1.4 %
ERYTHROCYTE [DISTWIDTH] IN BLOOD BY AUTOMATED COUNT: 16.4 % (ref 11–15)
EST. AVERAGE GLUCOSE BLD GHB EST-MCNC: 108 MG/DL (ref 68–126)
GFR SERPLBLD BASED ON 1.73 SQ M-ARVRAT: 112 ML/MIN/1.73M2 (ref 60–?)
GLOBULIN PLAS-MCNC: 3.6 G/DL (ref 2.8–4.4)
GLUCOSE BLD-MCNC: 66 MG/DL (ref 70–99)
HBA1C MFR BLD: 5.4 % (ref ?–5.7)
HCT VFR BLD AUTO: 36.8 %
HDLC SERPL-MCNC: 52 MG/DL (ref 40–59)
HGB BLD-MCNC: 10.8 G/DL
HPV I/H RISK 1 DNA SPEC QL NAA+PROBE: NEGATIVE
IMM GRANULOCYTES # BLD AUTO: 0.03 X10(3) UL (ref 0–1)
IMM GRANULOCYTES NFR BLD: 0.4 %
LDLC SERPL CALC-MCNC: 126 MG/DL (ref ?–100)
LYMPHOCYTES # BLD AUTO: 1.58 X10(3) UL (ref 1–4)
LYMPHOCYTES NFR BLD AUTO: 22.3 %
MCH RBC QN AUTO: 24 PG (ref 26–34)
MCHC RBC AUTO-ENTMCNC: 29.3 G/DL (ref 31–37)
MCV RBC AUTO: 81.8 FL
MONOCYTES # BLD AUTO: 0.43 X10(3) UL (ref 0.1–1)
MONOCYTES NFR BLD AUTO: 6.1 %
N GONORRHOEA DNA SPEC QL NAA+PROBE: NEGATIVE
NEUTROPHILS # BLD AUTO: 4.89 X10 (3) UL (ref 1.5–7.7)
NEUTROPHILS # BLD AUTO: 4.89 X10(3) UL (ref 1.5–7.7)
NEUTROPHILS NFR BLD AUTO: 68.8 %
NONHDLC SERPL-MCNC: 143 MG/DL (ref ?–130)
OSMOLALITY SERPL CALC.SUM OF ELEC: 287 MOSM/KG (ref 275–295)
PLATELET # BLD AUTO: 443 10(3)UL (ref 150–450)
POTASSIUM SERPL-SCNC: 4 MMOL/L (ref 3.5–5.1)
PROT SERPL-MCNC: 7.7 G/DL (ref 6.4–8.2)
RBC # BLD AUTO: 4.5 X10(6)UL
SODIUM SERPL-SCNC: 139 MMOL/L (ref 136–145)
TRIGL SERPL-MCNC: 95 MG/DL (ref 30–149)
VLDLC SERPL CALC-MCNC: 17 MG/DL (ref 0–30)
WBC # BLD AUTO: 7.1 X10(3) UL (ref 4–11)

## 2022-09-09 LAB
GENITAL VAGINOSIS SCREEN: NEGATIVE
TRICHOMONAS SCREEN: NEGATIVE

## 2022-09-14 ENCOUNTER — TELEPHONE (OUTPATIENT)
Dept: INTERNAL MEDICINE CLINIC | Facility: CLINIC | Age: 42
End: 2022-09-14

## 2022-09-20 LAB
LAST PAP RESULT: NORMAL
PAP HISTORY (OTHER THAN LAST PAP): NORMAL

## 2022-10-20 ENCOUNTER — TELEPHONE (OUTPATIENT)
Dept: INTERNAL MEDICINE CLINIC | Facility: CLINIC | Age: 42
End: 2022-10-20

## 2022-10-20 NOTE — TELEPHONE ENCOUNTER
Pt is calling stating that right bottom eyelid is swollen for about 2 weeks. Pt did mention went to [facility] and was given medication but the swelling went down.       Please call and advise

## 2023-08-22 ENCOUNTER — OFFICE VISIT (OUTPATIENT)
Dept: INTERNAL MEDICINE CLINIC | Facility: CLINIC | Age: 43
End: 2023-08-22
Payer: COMMERCIAL

## 2023-08-22 VITALS
TEMPERATURE: 97 F | SYSTOLIC BLOOD PRESSURE: 108 MMHG | WEIGHT: 189 LBS | DIASTOLIC BLOOD PRESSURE: 60 MMHG | BODY MASS INDEX: 30 KG/M2 | OXYGEN SATURATION: 99 % | HEART RATE: 95 BPM

## 2023-08-22 DIAGNOSIS — R30.0 DYSURIA: ICD-10-CM

## 2023-08-22 DIAGNOSIS — Z12.31 SCREENING MAMMOGRAM FOR BREAST CANCER: ICD-10-CM

## 2023-08-22 DIAGNOSIS — Z12.11 ENCOUNTER FOR SCREENING COLONOSCOPY: ICD-10-CM

## 2023-08-22 DIAGNOSIS — K59.00 CONSTIPATION, UNSPECIFIED CONSTIPATION TYPE: ICD-10-CM

## 2023-08-22 DIAGNOSIS — R82.90 CLOUDY URINE: Primary | ICD-10-CM

## 2023-08-22 LAB
APPEARANCE: CLEAR
BILIRUBIN: NEGATIVE
GLUCOSE (URINE DIPSTICK): NEGATIVE MG/DL
KETONES (URINE DIPSTICK): NEGATIVE MG/DL
LEUKOCYTES: NEGATIVE
MULTISTIX LOT#: ABNORMAL NUMERIC
NITRITE, URINE: NEGATIVE
OCCULT BLOOD: NEGATIVE
PH, URINE: 6.5 (ref 4.5–8)
PROTEIN (URINE DIPSTICK): 30 MG/DL
SPECIFIC GRAVITY: >=1.03 (ref 1–1.03)
URINE-COLOR: YELLOW
UROBILINOGEN,SEMI-QN: 0.2 MG/DL (ref 0–1.9)

## 2023-08-22 PROCEDURE — 3078F DIAST BP <80 MM HG: CPT | Performed by: INTERNAL MEDICINE

## 2023-08-22 PROCEDURE — 3074F SYST BP LT 130 MM HG: CPT | Performed by: INTERNAL MEDICINE

## 2023-08-22 PROCEDURE — 81003 URINALYSIS AUTO W/O SCOPE: CPT | Performed by: INTERNAL MEDICINE

## 2023-08-22 PROCEDURE — 99214 OFFICE O/P EST MOD 30 MIN: CPT | Performed by: INTERNAL MEDICINE

## 2023-08-22 PROCEDURE — 87086 URINE CULTURE/COLONY COUNT: CPT | Performed by: INTERNAL MEDICINE

## 2023-08-22 RX ORDER — NITROFURANTOIN 25; 75 MG/1; MG/1
100 CAPSULE ORAL 2 TIMES DAILY
Qty: 10 CAPSULE | Refills: 0 | Status: SHIPPED | OUTPATIENT
Start: 2023-08-22

## 2023-08-22 RX ORDER — SENNOSIDES A AND B 8.6 MG/1
1 TABLET, FILM COATED ORAL 2 TIMES DAILY PRN
Qty: 60 TABLET | Refills: 1 | Status: SHIPPED | OUTPATIENT
Start: 2023-08-22

## 2023-08-22 RX ORDER — MELATONIN
325
COMMUNITY

## 2024-02-21 ENCOUNTER — OFFICE VISIT (OUTPATIENT)
Dept: OBGYN CLINIC | Facility: CLINIC | Age: 44
End: 2024-02-21
Payer: COMMERCIAL

## 2024-02-21 ENCOUNTER — LAB ENCOUNTER (OUTPATIENT)
Dept: LAB | Facility: HOSPITAL | Age: 44
End: 2024-02-21
Attending: STUDENT IN AN ORGANIZED HEALTH CARE EDUCATION/TRAINING PROGRAM
Payer: COMMERCIAL

## 2024-02-21 VITALS
WEIGHT: 182.13 LBS | HEART RATE: 80 BPM | DIASTOLIC BLOOD PRESSURE: 79 MMHG | HEIGHT: 67 IN | BODY MASS INDEX: 28.58 KG/M2 | SYSTOLIC BLOOD PRESSURE: 121 MMHG

## 2024-02-21 DIAGNOSIS — N92.0 MENORRHAGIA WITH REGULAR CYCLE: Primary | ICD-10-CM

## 2024-02-21 LAB
BASOPHILS # BLD AUTO: 0.08 X10(3) UL (ref 0–0.2)
BASOPHILS NFR BLD AUTO: 1.1 %
DEPRECATED RDW RBC AUTO: 47.3 FL (ref 35.1–46.3)
EOSINOPHIL # BLD AUTO: 0.1 X10(3) UL (ref 0–0.7)
EOSINOPHIL NFR BLD AUTO: 1.4 %
ERYTHROCYTE [DISTWIDTH] IN BLOOD BY AUTOMATED COUNT: 21.3 % (ref 11–15)
HCT VFR BLD AUTO: 29.1 %
HGB BLD-MCNC: 7.7 G/DL
IMM GRANULOCYTES # BLD AUTO: 0.02 X10(3) UL (ref 0–1)
IMM GRANULOCYTES NFR BLD: 0.3 %
LYMPHOCYTES # BLD AUTO: 1.88 X10(3) UL (ref 1–4)
LYMPHOCYTES NFR BLD AUTO: 26.8 %
MCH RBC QN AUTO: 16.6 PG (ref 26–34)
MCHC RBC AUTO-ENTMCNC: 26.5 G/DL (ref 31–37)
MCV RBC AUTO: 62.7 FL
MONOCYTES # BLD AUTO: 0.56 X10(3) UL (ref 0.1–1)
MONOCYTES NFR BLD AUTO: 8 %
NEUTROPHILS # BLD AUTO: 4.37 X10 (3) UL (ref 1.5–7.7)
NEUTROPHILS # BLD AUTO: 4.37 X10(3) UL (ref 1.5–7.7)
NEUTROPHILS NFR BLD AUTO: 62.4 %
PLATELET # BLD AUTO: 534 10(3)UL (ref 150–450)
RBC # BLD AUTO: 4.64 X10(6)UL
WBC # BLD AUTO: 7 X10(3) UL (ref 4–11)

## 2024-02-21 PROCEDURE — 36415 COLL VENOUS BLD VENIPUNCTURE: CPT | Performed by: STUDENT IN AN ORGANIZED HEALTH CARE EDUCATION/TRAINING PROGRAM

## 2024-02-21 PROCEDURE — 99204 OFFICE O/P NEW MOD 45 MIN: CPT | Performed by: STUDENT IN AN ORGANIZED HEALTH CARE EDUCATION/TRAINING PROGRAM

## 2024-02-21 PROCEDURE — 85060 BLOOD SMEAR INTERPRETATION: CPT | Performed by: STUDENT IN AN ORGANIZED HEALTH CARE EDUCATION/TRAINING PROGRAM

## 2024-02-21 PROCEDURE — 85025 COMPLETE CBC W/AUTO DIFF WBC: CPT | Performed by: STUDENT IN AN ORGANIZED HEALTH CARE EDUCATION/TRAINING PROGRAM

## 2024-02-21 NOTE — PROGRESS NOTES
[facility]  Obstetrics and Gynecology  Gyne Problem Visit      Katia Emanuel is a 43 year old female  sent to us by her PCP with concerns of anemia due to heavy menses. On her heaviest days patient utilizes super absorbant tampons with pads and still leaks through for the first 4 days. Patient will experience lightheadedness and migraines while on her period. She tries to stay well hydrated, incorporate more iron in her diet and take iron supplements daily. Last CBC was back in , HGB 10.8. She is not currently on any form of contraception. She denies any known history of uterine fibroids or polyps. Has not had any recent imaging done. No abnormal vaginal discharge, odor, or irritation.     Patient's last menstrual period was 2024 (exact date).     Pap: 2022  Contraception: Tubal Ligation    OBSTETRICS HISTORY:  OB History    Para Term  AB Living   2 2 2 0 0 2   SAB IAB Ectopic Multiple Live Births   0 0 0 0 0       GYNE HISTORY:  Hx Prior Abnormal Pap: No  Pap Date: 22  Pap Result Notes: Normal   Menarche: 10 years (2024  8:58 AM)  Period Cycle (Days): 28-30 (2024  8:58 AM)  Period Duration (Days): 7 days (2024  8:58 AM)  Period Flow: Heavy (2024  8:58 AM)  Use of Birth Control (if yes, specify type): Tubal Ligation (2024  8:58 AM)  Hx Prior Abnormal Pap: No (2024  8:58 AM)  Pap Date: 22 (2024  8:58 AM)  Pap Result Notes: Normal (2024  8:58 AM)        Latest Ref Rng & Units 2022    12:16 PM 2019    10:21 AM   RECENT PAP RESULTS   Thinprep Pap Negative for intraepithelial lesion or malignancy Negative for intraepithelial lesion or malignancy  Negative for intraepithelial lesion or malignancy    HPV Negative Negative  Negative          History   Sexual Activity    Sexual activity: Not on file       MEDICAL HISTORY:  Past Medical History:   Diagnosis Date    Anemia     Hypothyroidism     age [age] w/ meds during this time; had  radiation txt and benign biopsy of nodules.     Iron (Fe) deficiency anemia      Past Surgical History:   Procedure Laterality Date    CHOLECYSTECTOMY      REMOVAL GALLBLADDER      TUBAL LIGATION         SOCIAL HISTORY:  Social History     Socioeconomic History    Marital status:      Spouse name: Not on file    Number of children: Not on file    Years of education: Not on file    Highest education level: Not on file   Occupational History    Not on file   Tobacco Use    Smoking status: Never    Smokeless tobacco: Never   Vaping Use    Vaping Use: Never used   Substance and Sexual Activity    Alcohol use: No    Drug use: Never    Sexual activity: Not on file   Other Topics Concern    Caffeine Concern Not Asked    Exercise Not Asked    Seat Belt Not Asked    Special Diet Not Asked    Stress Concern Not Asked    Weight Concern Not Asked    Left Handed Not Asked    Right Handed Yes    Currently spends a great deal of time in the sun Not Asked    Past Sunlamp Treatments for Acne Not Asked    History of tanning Not Asked    Hx of Spending Great Deal of Time in Sun Not Asked    Bad sunburns in the past Not Asked    Tanning Salons in the Past Not Asked    Hx of Radiation Treatments Not Asked    Regular use of sun block Not Asked   Social History Narrative    Not on file     Social Determinants of Health     Financial Resource Strain: Not on file   Food Insecurity: Not on file   Transportation Needs: Not on file   Physical Activity: Not on file   Stress: Not on file   Social Connections: Not on file   Housing Stability: Not on file       MEDICATIONS:    Current Outpatient Medications:     ferrous sulfate 325 (65 FE) MG Oral Tab EC, Take 1 tablet (325 mg total) by mouth daily with breakfast., Disp: , Rfl:     nitrofurantoin monohydrate macro 100 MG Oral Cap, Take 1 capsule (100 mg total) by mouth 2 (two) times daily. (Patient not taking: Reported on 2/21/2024), Disp: 10 capsule, Rfl: 0    sennosides (SENOKOT) 8.6 MG  Oral Tab, Take 1 tablet (8.6 mg total) by mouth 2 (two) times daily as needed for constipation. (Patient not taking: Reported on 2/21/2024), Disp: 60 tablet, Rfl: 1    ALLERGIES:    Allergies   Allergen Reactions    Fish-Derived Products RASH    Other RASH     Sweet potato         REVIEW OF SYSTEMS:  Review of Systems   Constitutional:  Negative for appetite change, chills and fever.   Gastrointestinal:  Negative for abdominal pain, constipation, diarrhea, nausea and vomiting.   Genitourinary:  Positive for menstrual problem (heavy menses). Negative for difficulty urinating, dysuria, flank pain, frequency, genital sores, hematuria, pelvic pain, urgency, vaginal bleeding, vaginal discharge and vaginal pain.   Skin:  Negative for rash.   Neurological:  Negative for dizziness, light-headedness and headaches.       PHYSICAL EXAM:  /79 (BP Location: Right arm, Patient Position: Sitting, Cuff Size: adult)   Pulse 80   Ht 5' 7\" (1.702 m)   Wt 182 lb 2 oz (82.6 kg)   LMP 01/18/2024 (Exact Date)   BMI 28.52 kg/m²     GENERAL: well developed, well nourished, in no apparent distress  ABDOMEN: Soft, non distended; non tender, no masses  GYNE/: External Genitalia: Normal appearing, no lesions. Urethral meatus appear wnl, no abnormal discharge or lesions noted.          Bladder: well supported, urethra wnl, no lesions or fissures                     Vagina: normal pink mucosa, no lesions, normal clear discharge.                      Uterus: mobile, non tender, normal size                     Cervix: Normal                      Adnexa: non tender, no masses, normal size     ASSESSMENT:       ICD-10-CM    1. Menorrhagia with regular cycle  N92.0 US PELVIS (TRANSABDOMINAL AND TRANSVAGINAL) (CPT=76856/82841)     CBC With Differential With Platelet          Plan:  -Pt counseled on possible etiologies of heavy periods and/or irregular bleeding including uterine fibroids, DUB/anovulatory bleeding and other benign and less  common malignant etiologies.  Discussed possible work-up options including exam, pelvic US and endometrial biopsy.  Discussed treatment options including medical and surgical.  Medical options include OCPs, Nuvaring, patch for cycle control along with depo provera for menstrual suppression.  Discussed Mirena IUD and menstrual benefits.  Discussed surgical options that include endometrial ablation and hysterectomy.  Pt counseled on risks/benefits of each of the appropriate options.  - Ultrasound and CBC ordered  - Patient will let us know which option she is most comfortable with.       BRADY AL PA-C  9:02 AM  2/21/2024

## 2024-03-06 ENCOUNTER — HOSPITAL ENCOUNTER (OUTPATIENT)
Dept: ULTRASOUND IMAGING | Age: 44
Discharge: HOME | End: 2024-03-06
Attending: STUDENT IN AN ORGANIZED HEALTH CARE EDUCATION/TRAINING PROGRAM
Payer: COMMERCIAL

## 2024-03-06 DIAGNOSIS — N92.0 MENORRHAGIA WITH REGULAR CYCLE: ICD-10-CM

## 2024-03-06 PROCEDURE — 76856 US EXAM PELVIC COMPLETE: CPT | Performed by: STUDENT IN AN ORGANIZED HEALTH CARE EDUCATION/TRAINING PROGRAM

## 2024-03-06 PROCEDURE — 76830 TRANSVAGINAL US NON-OB: CPT | Performed by: STUDENT IN AN ORGANIZED HEALTH CARE EDUCATION/TRAINING PROGRAM

## 2024-03-07 ENCOUNTER — PATIENT MESSAGE (OUTPATIENT)
Dept: OBGYN CLINIC | Facility: CLINIC | Age: 44
End: 2024-03-07

## 2024-03-07 NOTE — TELEPHONE ENCOUNTER
Discussed results and recommendations with patient. All questions answered. Recommended follow-up with MD provider to discuss possible polypectomy.

## 2024-03-07 NOTE — TELEPHONE ENCOUNTER
----- Message from Kailyn Rosenthal RN sent at 3/7/2024  8:49 AM CST -----  Regarding: FW: test results from the pelvis ultrasound   Contact: [phone]    ----- Message -----  From: Katia Emanuel  Sent: 3/7/2024   8:45 AM CST  To: [facility] Clinical Staff  Subject: test results from the pelvis ultrasound          Hi good morning   I did the ultrasound yesterday morning and I saw the results on my chart and I wanted to know what is the next , to see if I need surgery or do I needs to be on birth control pills . if you can give me call at [phone]

## 2024-03-07 NOTE — TELEPHONE ENCOUNTER
----- Message from Kailyn Rosenthal RN sent at 3/7/2024 11:36 AM CST -----  Regarding: FW: test results from the pelvis ultrasound   Contact: [phone]    ----- Message -----  From: Katia Emanuel  Sent: 3/7/2024  11:20 AM CST  To: [facility] Ellison Ob/Gyne Clinical Staff  Subject: test results from the pelvis ultrasound          Hi there  I am so sorry that i missed your call . I tried calling back but it went to voice mail   when your available please give me a call [phone]

## 2024-03-21 ENCOUNTER — TELEPHONE (OUTPATIENT)
Dept: OBGYN CLINIC | Facility: CLINIC | Age: 44
End: 2024-03-21

## 2024-03-21 ENCOUNTER — HOSPITAL ENCOUNTER (OUTPATIENT)
Dept: MAMMOGRAPHY | Age: 44
Discharge: HOME | End: 2024-03-21
Attending: INTERNAL MEDICINE
Payer: COMMERCIAL

## 2024-03-21 ENCOUNTER — OFFICE VISIT (OUTPATIENT)
Dept: OBGYN CLINIC | Facility: CLINIC | Age: 44
End: 2024-03-21
Payer: COMMERCIAL

## 2024-03-21 VITALS
SYSTOLIC BLOOD PRESSURE: 114 MMHG | HEIGHT: 67 IN | DIASTOLIC BLOOD PRESSURE: 68 MMHG | WEIGHT: 191.5 LBS | BODY MASS INDEX: 30.06 KG/M2

## 2024-03-21 DIAGNOSIS — N84.0 ENDOMETRIAL POLYP: ICD-10-CM

## 2024-03-21 DIAGNOSIS — D64.9 ACUTE ON CHRONIC ANEMIA: ICD-10-CM

## 2024-03-21 DIAGNOSIS — N93.9 ABNORMAL UTERINE BLEEDING (AUB): Primary | ICD-10-CM

## 2024-03-21 DIAGNOSIS — Z12.31 SCREENING MAMMOGRAM FOR BREAST CANCER: ICD-10-CM

## 2024-03-21 PROCEDURE — 77067 SCR MAMMO BI INCL CAD: CPT | Performed by: INTERNAL MEDICINE

## 2024-03-21 PROCEDURE — 77063 BREAST TOMOSYNTHESIS BI: CPT | Performed by: INTERNAL MEDICINE

## 2024-03-21 PROCEDURE — 99205 OFFICE O/P NEW HI 60 MIN: CPT | Performed by: OBSTETRICS & GYNECOLOGY

## 2024-03-21 NOTE — PATIENT INSTRUCTIONS
Hysteroscopy  Hysteroscopy is a procedure done to see inside your uterus. It can help find the cause of problems in the uterus. This helps your healthcare provider decide on the best treatment. In some cases, it can be used to perform treatment. Hysteroscopy may be done in your healthcare provider's office, outpatient surgery center, or in the hospital.    Why might I need hysteroscopy?  Hysteroscopy may be done based on the results of other tests. It can help find the cause of problems. These can include:  Unusually heavy or long menstrual periods  Bleeding between periods  Postmenopausal bleeding  Trouble becoming pregnant (infertility) or carrying a pregnancy to term  To locate an IUD (intrauterine device)  What are the risks and possible complications of hysteroscopy?  Problems with the procedure are rare. But all procedures have risks. Risks of hysteroscopy include:  Infection  Bleeding  Tearing of the uterine wall  Damage to internal organs  Scarring of the uterus  Fluid overload  Problems with anesthesia, the medicine that prevents pain during the procedure  How do I get ready for hysteroscopy?  Tell your healthcare provider if you have any health problems. These include diabetes, high blood pressure, heart disease, or bleeding problems.  Tell your healthcare provider about all the medicines you take. This includes any over-the-counter medicines, prescription medicines, vitamins, herbs, or supplements.  You may be told not to use vaginal creams or medicine. And you may be told not to have sex or wash out your vagina (douche).  Follow any directions you are given for not eating or drinking before procedure.  You may be tested for pregnancy and infection.  You may be asked to sign a consent form.  You may be given a pain reliever to take an hour before the procedure. This helps relieve cramping that may occur.    What happens during a hysteroscopy?  You’ll lie on an exam table with your feet in stirrups.  You  may be given general anesthesia or medicines to help you relax or sleep. In some cases, an IV (intravenous) line will be put into a vein in your arm or hand. This line is then used to give fluids and medicines.  A tool called a speculum is inserted into the vagina to hold it open. A tool called a dilator may be used to widen the cervix.  Numbing medicine may be applied to the cervix.  A long, thin lighted tube with a camera attached (hysteroscope) is inserted through the vagina and into the uterus. It's used to see inside the uterus. Images of the uterus are viewed on a monitor.  A gas or fluid may be injected into the uterus to expand it.  Other tools may be put through the hysteroscope. These are used to take tissue samples or remove growths.    What happens after hysteroscopy?  You may have cramps and bleeding for short time after the procedure. This is normal. Use pads instead of tampons.  Don't douche or use tampons until your healthcare provider says it’s OK.  Don't use any vaginal medicines until you are told it’s OK.  Ask your healthcare provider when it’s OK to have sex again.    When to call your healthcare provider  Call your healthcare provider if any of the following occur:  Heavy bleeding (more than 1 pad an hour for 2 or more hours)  A fever of 100.4°F ( 38.0°C) or higher, or as directed by your provider  Increasing belly (abdominal) pain or soreness  Bad-smelling discharge  Follow-up care  Schedule a follow-up visit with your healthcare provider. Based on your test results, you may need more treatment. Be sure to follow directions and keep your appointments.  W[employer] last reviewed this educational content on 8/1/2022 © 2000-2023 [employer]. All rights reserved. This information is not intended as a substitute for professional medical care. Always follow your healthcare professional's instructions.

## 2024-03-21 NOTE — H&P
[facility]  Obstetrics and Gynecology  History & Physical    CC: Patient is here for preop visit 2/2 abnormal uterine bleeding endometrial polyp    SUBJECTIVE:    Katia Emanuel is a 44 year old  female who presents for preop 2/2 abnormal uterine bleeding. The patient was seen in office with complaint of abnormal uterine bleeding. Patient reported heavy regular menses. Anemia noted with symptoms of lightheadedness and dizziness. Noted she is on about day 3-4 of her menses now. Pelvic US was performed and noted for endometrial polyp. A discussion was held with the patient regarding findings and recommendations. The patient was offered hysteroscopy polypectomy and endometrial ablation. The patient was agreeable with recommendations.    Obstetric History:   OB History    Para Term  AB Living   2 2 2 0 0 2   SAB IAB Ectopic Multiple Live Births   0 0 0 0 0      # Outcome Date GA Lbr Jakob/2nd Weight Sex Delivery Anes PTL Lv   2 Term      Caesarean      1 Term      NORMAL SPONT        Past Medical History:   Past Medical History:   Diagnosis Date    Anemia     Hypothyroidism     age [age] w/ meds during this time; had radiation txt and benign biopsy of nodules.     Iron (Fe) deficiency anemia      Past Surgical History:   Past Surgical History:   Procedure Laterality Date    CHOLECYSTECTOMY      REMOVAL GALLBLADDER      TUBAL LIGATION       Family History:   Family History   Problem Relation Age of Onset    Diabetes Father     Prostate Cancer Father         colon cancer    Diabetes Sister     Other (Cervical Cancer) Sister         cervical cancer     Social History:   Social History     Tobacco Use    Smoking status: Never    Smokeless tobacco: Never   Substance Use Topics    Alcohol use: No       Home Meds: (Not in a hospital admission)    Allergies:   Allergies   Allergen Reactions    Fish-Derived Products RASH    Other RASH     Sweet potato       Review of Systems:  General: no  complaints  Eyes: no complaints  Respiratory: no complaints  Cardiovascular: no complaints  GI: no complaints    : See HPI   Hematological/lymphatic: no complaints  Psychiatric: no complaints  Endocrine:no complaints  Neurological: no complaints  Immunological: no complaints  Musculoskeletal:no complaints    OBJECTIVE:    Vitals:    03/21/24 1202   BP: 114/68      Body mass index is 29.99 kg/m².    Exam with ANNELISE Owusu present:   GENERAL: well developed, well nourished, in no apparent distress, alert and orientated X 3  PSYCH: mood and affect stable   SKIN: no rashes, no lesions  LUNGS: respiration unlabored  CARDIOVASCULAR: no peripheral edema or varicosities, skin warm and dry  ABDOMEN: Soft, non distended; non tender, no masses  GYNE/:   External Genitalia: normal, no lesions, good perineal support  Urethra: meatus normal   Bladder: well supported  Vagina: normal mucosa, no lesions, normal bloody discharge   Uterus: normal size, mobile, nontender  Cervix: normal os, no lesions or bleeding  Adnexa:normal size, bilaterally nontender, no palpable masses  Cul-de-sac: normal  R/V: normal perineum  EXTREMITIES:  Normal range of motion, strength 5/5 walking.     Labs:   Latest Reference Range & Units 02/21/24 10:05   WBC 4.0 - 11.0 x10(3) uL 7.0   Hemoglobin 12.0 - 16.0 g/dL 7.7 (L)   Hematocrit 35.0 - 48.0 % 29.1 (L)   Platelet Count 150.0 - 450.0 10(3)uL 534.0 (H)   RBC 3.80 - 5.30 x10(6)uL 4.64   MCH 26.0 - 34.0 pg 16.6 (L)   MCHC 31.0 - 37.0 g/dL 26.5 (L)   MCV 80.0 - 100.0 fL 62.7 (L)   RDW 11.0 - 15.0 % 21.3 (H)   RDW-SD 35.1 - 46.3 fL 47.3 (H)   (L): Data is abnormally low  (H): Data is abnormally high  Imaging:  Pelvic US 3/6/24  \"  FINDINGS:  UTERUS:    SIZE: The uterus measures 9.4 x 5.4 x 8.1 cm.  ENDOMETRIUM: Endometrial thickness 1.9 cm. An ovoid echogenic mass in the endometrial canal measures up to 1.9 x 1.6 x 1.8 cm. On color Doppler interrogation, this appears to have internal  vascularity. Prominent nabothian cysts are suggested in the  endocervical region.  MYOMETRIUM: Normal echogenicity and homogeneous in echotexture without masses.       OVARIES AND ADNEXA:  RIGHT:   The right ovary measures 3.2 x 2.4 x 2.1 cm and has a sonographically unremarkable appearance.  LEFT:   The left ovary measures 2.7 x 1.9 x 2.1 cm and demonstrates a grossly unremarkable sonographic appearance.     CUL-DE-SAC:   Small volume free fluid is appreciated.    OTHER: Limited sonographic views of the bladder are grossly unremarkable.               Impression   CONCLUSION:  1. There is a possible 1.9 cm endometrial polyp. Histopathologic correlation may be of benefit.     2. Otherwise sonographically unremarkable appearance of the uterus and ovaries.   \"       ASSESSMENT/ PLAN:    Katia Emanuel is a 44 year old  female who presents for preop visit 2/2 abnormal uterine bleeding endometrial polyp.    Patient Active Problem List   Diagnosis    Chronic bilateral low back pain without sciatica    Acute on chronic anemia    Endometrial polyp    Abnormal uterine bleeding (AUB)       -Will plan for Hysteroscopy endometrial polypectomy with TruClear and Novasure endometrial ablation on 24 or 24.  - Admit for outpatient surgical procedure   - IVF: LR @ 100 cc/hr   - Diet: NPO  - Meds: no preoperative antibiotics indicated  - VTE prophylaxis: SCDs   - Anesthesia to evaluate   - Written consent to be obtained the day of surgery and placed in chart       Risks, benefits, alternatives and possible complications have been discussed in detail with the patient.  Pre-admission, admission, and post admission procedures and expectations were discussed in detail.  All questions answered, all appropriate consents will be signed at the Hospital.     Dr. Niraj Osorio MD    [facility]     This note was created by Dragon voice recognition. Errors in content may be related to improper recognition by the system;  efforts to review and correct have been done but errors may still exist. Please be advised the primary purpose of this note is for me to communicate medical care. Standard sentence structure is not always used. Medical terminology and medical abbreviations may be used. There may be grammatical, typographical, and automated fill ins that may have errors missed in proofreading.

## 2024-03-21 NOTE — TELEPHONE ENCOUNTER
----- Message from Niraj Osorio MD sent at 3/21/2024 12:34 PM CDT -----  Regarding: Please schedule for surgery    Please schedule the following surgery:    Procedure: Hysteroscopy [place] Clear endometrial polypectomy, Novasure endometrial ablation.   Assist: No  Date:   4/12/24 following clinic or 4/18/24                                    Time Requested: Following clinic on 4/12/24 or morning 4/18/24   Dx: abnormal uterine bleeding, endometrial polyp, acute on chronic anemia.   Pre-op appt: Yes done 03/21/24   Admission type: Out  Department of discharge: SDS  Expected length of stay: 0 days   Procedure length time (please enter amount you are requesting): 1 hours   Recovery time: 2-3 days  Medical Clearance: No  Post- Op f/u appt time frame: 2 weeks         ALL Medicaid/including BCBS community: Tubal/ Hyst form MUST be signed (30 days): N/a      Message to nurses: None    Thank you.     Dr. Osorio

## 2024-03-21 NOTE — H&P (VIEW-ONLY)
[facility]  Obstetrics and Gynecology  History & Physical    CC: Patient is here for preop visit 2/2 abnormal uterine bleeding endometrial polyp    SUBJECTIVE:    Katia Emanuel is a 44 year old  female who presents for preop 2/2 abnormal uterine bleeding. The patient was seen in office with complaint of abnormal uterine bleeding. Patient reported heavy regular menses. Anemia noted with symptoms of lightheadedness and dizziness. Noted she is on about day 3-4 of her menses now. Pelvic US was performed and noted for endometrial polyp. A discussion was held with the patient regarding findings and recommendations. The patient was offered hysteroscopy polypectomy and endometrial ablation. The patient was agreeable with recommendations.    Obstetric History:   OB History    Para Term  AB Living   2 2 2 0 0 2   SAB IAB Ectopic Multiple Live Births   0 0 0 0 0      # Outcome Date GA Lbr Jakob/2nd Weight Sex Delivery Anes PTL Lv   2 Term      Caesarean      1 Term      NORMAL SPONT        Past Medical History:   Past Medical History:   Diagnosis Date    Anemia     Hypothyroidism     age [age] w/ meds during this time; had radiation txt and benign biopsy of nodules.     Iron (Fe) deficiency anemia      Past Surgical History:   Past Surgical History:   Procedure Laterality Date    CHOLECYSTECTOMY      REMOVAL GALLBLADDER      TUBAL LIGATION       Family History:   Family History   Problem Relation Age of Onset    Diabetes Father     Prostate Cancer Father         colon cancer    Diabetes Sister     Other (Cervical Cancer) Sister         cervical cancer     Social History:   Social History     Tobacco Use    Smoking status: Never    Smokeless tobacco: Never   Substance Use Topics    Alcohol use: No       Home Meds: (Not in a hospital admission)    Allergies:   Allergies   Allergen Reactions    Fish-Derived Products RASH    Other RASH     Sweet potato       Review of Systems:  General: no  complaints  Eyes: no complaints  Respiratory: no complaints  Cardiovascular: no complaints  GI: no complaints    : See HPI   Hematological/lymphatic: no complaints  Psychiatric: no complaints  Endocrine:no complaints  Neurological: no complaints  Immunological: no complaints  Musculoskeletal:no complaints    OBJECTIVE:    Vitals:    03/21/24 1202   BP: 114/68      Body mass index is 29.99 kg/m².    Exam with ANNELISE Owusu present:   GENERAL: well developed, well nourished, in no apparent distress, alert and orientated X 3  PSYCH: mood and affect stable   SKIN: no rashes, no lesions  LUNGS: respiration unlabored  CARDIOVASCULAR: no peripheral edema or varicosities, skin warm and dry  ABDOMEN: Soft, non distended; non tender, no masses  GYNE/:   External Genitalia: normal, no lesions, good perineal support  Urethra: meatus normal   Bladder: well supported  Vagina: normal mucosa, no lesions, normal bloody discharge   Uterus: normal size, mobile, nontender  Cervix: normal os, no lesions or bleeding  Adnexa:normal size, bilaterally nontender, no palpable masses  Cul-de-sac: normal  R/V: normal perineum  EXTREMITIES:  Normal range of motion, strength 5/5 walking.     Labs:   Latest Reference Range & Units 02/21/24 10:05   WBC 4.0 - 11.0 x10(3) uL 7.0   Hemoglobin 12.0 - 16.0 g/dL 7.7 (L)   Hematocrit 35.0 - 48.0 % 29.1 (L)   Platelet Count 150.0 - 450.0 10(3)uL 534.0 (H)   RBC 3.80 - 5.30 x10(6)uL 4.64   MCH 26.0 - 34.0 pg 16.6 (L)   MCHC 31.0 - 37.0 g/dL 26.5 (L)   MCV 80.0 - 100.0 fL 62.7 (L)   RDW 11.0 - 15.0 % 21.3 (H)   RDW-SD 35.1 - 46.3 fL 47.3 (H)   (L): Data is abnormally low  (H): Data is abnormally high  Imaging:  Pelvic US 3/6/24  \"  FINDINGS:  UTERUS:    SIZE: The uterus measures 9.4 x 5.4 x 8.1 cm.  ENDOMETRIUM: Endometrial thickness 1.9 cm. An ovoid echogenic mass in the endometrial canal measures up to 1.9 x 1.6 x 1.8 cm. On color Doppler interrogation, this appears to have internal  vascularity. Prominent nabothian cysts are suggested in the  endocervical region.  MYOMETRIUM: Normal echogenicity and homogeneous in echotexture without masses.       OVARIES AND ADNEXA:  RIGHT:   The right ovary measures 3.2 x 2.4 x 2.1 cm and has a sonographically unremarkable appearance.  LEFT:   The left ovary measures 2.7 x 1.9 x 2.1 cm and demonstrates a grossly unremarkable sonographic appearance.     CUL-DE-SAC:   Small volume free fluid is appreciated.    OTHER: Limited sonographic views of the bladder are grossly unremarkable.               Impression   CONCLUSION:  1. There is a possible 1.9 cm endometrial polyp. Histopathologic correlation may be of benefit.     2. Otherwise sonographically unremarkable appearance of the uterus and ovaries.   \"       ASSESSMENT/ PLAN:    Katia Emanuel is a 44 year old  female who presents for preop visit 2/2 abnormal uterine bleeding endometrial polyp.    Patient Active Problem List   Diagnosis    Chronic bilateral low back pain without sciatica    Acute on chronic anemia    Endometrial polyp    Abnormal uterine bleeding (AUB)       -Will plan for Hysteroscopy endometrial polypectomy with TruClear and Novasure endometrial ablation on 24 or 24.  - Admit for outpatient surgical procedure   - IVF: LR @ 100 cc/hr   - Diet: NPO  - Meds: no preoperative antibiotics indicated  - VTE prophylaxis: SCDs   - Anesthesia to evaluate   - Written consent to be obtained the day of surgery and placed in chart       Risks, benefits, alternatives and possible complications have been discussed in detail with the patient.  Pre-admission, admission, and post admission procedures and expectations were discussed in detail.  All questions answered, all appropriate consents will be signed at the Hospital.     Dr. Niraj Osorio MD    [facility]     This note was created by Dragon voice recognition. Errors in content may be related to improper recognition by the system;  efforts to review and correct have been done but errors may still exist. Please be advised the primary purpose of this note is for me to communicate medical care. Standard sentence structure is not always used. Medical terminology and medical abbreviations may be used. There may be grammatical, typographical, and automated fill ins that may have errors missed in proofreading.

## 2024-04-10 NOTE — DISCHARGE INSTRUCTIONS
Post Operative Home Care Instructions     We hope you were pleased with your care at [facility].  We wish you the best outcome and overall experience.  These instructions will help to minimize pain, limit the risk for an infection, and improve the likelihood of a successful recovery.  What to Expect:  Abdominal cramping   Vaginal bleeding for about 1-2 weeks   Constipation is common after surgery. Please keep up with Colace twice per day and Miralax as needed.   Over-The-Counter Medication  Non-prescription anti-inflammatory medications can also help to ease the pain.  You may take Aleve, Tylenol or Ibuprofen   Colace or Metamucil for Constipation  Drink a full glass of water with oral medication and take as directed.  Bathing/Showers  You may resume showers  No baths, swimming, hot tubs for at least 8 weeks.   Home Medication  Resume your home medications as instructed  Diet   Resume your normal diet  Activity  Refrain from vaginal intercourse, vaginal suppositories, tampon use or douches until after your follow up visit   No exercising for 1-2 weeks  You may climb stairs   Return to Work or School  You may return to work in a few days   Contact your gynecologist's office, if you need a medical release. ([phone])    Driving  Avoid driving if taking narcotics  Follow-up Appointment with Your Obstetrician  Call your Gynecologist's office today for a staple removal/incision check appointment and/or follow up appointment.   The number is [phone].  Verify your appointment date, day, time, and location.  At your office visit:  Your progress will be evaluated, pathology will be reviewed, and any additional concerns and instructions will be discussed.    Questions or Concerns  Call your gynecologist's office if you experience the following:  Severe pain not controlled by pain medication  Foul smelling vaginal discharge  Heavy bleeding  Shortness of breath  Fever  Crying and periods of sadness that  prevents you from caring for yourself   Burning sensation during urination or inability to urinate  Swelling, redness or abnormal warmth to your leg/calf  Please call [phone]. If your call is made after office hours, a physician will be available to help you.  There is always a provider covering our patients.    Thank you for coming to [facility] for your surgery.  The nurses, gynecologist, and the anesthesiologists try very hard to make sure you receive the best care possible.  Your trust in them as well as us is greatly appreciated.    Thanks so much,   The Providers of [facility]st[facility]ne[facility]       HOME INSTRUCTIONS  [facility] HOME CARE INSTRUCTIONS: POST-OP ANESTHESIA  The medication that you received for sedation or general anesthesia can last up to 24 hours. Your judgment and reflexes may be altered, even if you feel like your normal self.      We Recommend:   Do not drive any motor vehicle or bicycle   Avoid mowing the lawn, playing sports, or working with power tools/applicances (power saws, electric knives or mixers)   That you have someone stay with you on your first night home   Do not drink alcohol or take sleeping pills or tranquilizers   Do not sign legal documents within 24 hours of your procedure   If you had a nerve block for your surgery, take extra care not to put any pressure on your arm or hand for 24 hours    It is normal:  For you to have a sore throat if you had a breathing tube during surgery (while you were asleep!). The sore throat should get better within 48 hours. You can gargle with warm salt water (1/2 tsp in 4 oz warm water) or use a throat lozenge for comfort  To feel muscle aches or soreness especially in the abdomen, chest or neck. The achy feeling should go away in the next 24 hours  To feel weak, sleepy or \"wiped out\". Your should start feeling better in the next 24 hours.   To experience mild discomforts such as sore lip or tongue, headache, cramps, gas  pains or a bloated feeling in your abdomen.   To experience mild back pain or soreness for a day or two if you had spinal or epidural anesthesia.   If you had laparoscopic surgery, to feel shoulder pain or discomfort on the day of surgery.   For some patients to have nausea after surgery/anesthesia    If you feel nausea or experience vomiting:   Try to move around less.   Eat less than usual or drink only liquids until the next morning   Nausea should resolve in about 24 hours    If you have a problem when you are at home:    Call your surgeons office   Discharge Instructions: After Your Surgery  You’ve just had surgery. During surgery, you were given medicine called anesthesia to keep you relaxed and free of pain. After surgery, you may have some pain or nausea. This is common. Here are some tips for feeling better and getting well after surgery.   Going home  Your healthcare provider will show you how to take care of yourself when you go home. They'll also answer your questions. Have an adult family member or friend drive you home. For the first 24 hours after your surgery:   Don't drive or use heavy equipment.  Don't make important decisions or sign legal papers.  Take medicines as directed.  Don't drink alcohol.  Have someone stay with you, if needed. They can watch for problems and help keep you safe.  Be sure to go to all follow-up visits with your healthcare provider. And rest after your surgery for as long as your provider tells you to.   Coping with pain  If you have pain after surgery, pain medicine will help you feel better. Take it as directed, before pain becomes severe. Also, ask your healthcare provider or pharmacist about other ways to control pain. This might be with heat, ice, or relaxation. And follow any other instructions your surgeon or nurse gives you.      Stay on schedule with your medicine.     Tips for taking pain medicine  To get the best relief possible, remember these points:   Pain  medicines can upset your stomach. Taking them with a little food may help.  Most pain relievers taken by mouth need at least 20 to 30 minutes to start to work.  Don't wait till your pain becomes severe before you take your medicine. Try to time your medicine so that you can take it before starting an activity. This might be before you get dressed, go for a walk, or sit down for dinner.  Constipation is a common side effect of some pain medicines. Call your healthcare provider before taking any medicines such as laxatives or stool softeners to help ease constipation. Also ask if you should skip any foods. Drinking lots of fluids and eating foods such as fruits and vegetables that are high in fiber can also help. Remember, don't take laxatives unless your surgeon has prescribed them.  Drinking alcohol and taking pain medicine can cause dizziness and slow your breathing. It can even be deadly. Don't drink alcohol while taking pain medicine.  Pain medicine can make you react more slowly to things. Don't drive or run machinery while taking pain medicine.  Your healthcare provider may tell you to take acetaminophen to help ease your pain. Ask them how much you're supposed to take each day. Acetaminophen or other pain relievers may interact with your prescription medicines or other over-the-counter (OTC) medicines. Some prescription medicines have acetaminophen and other ingredients in them. Using both prescription and OTC acetaminophen for pain can cause you to accidentally overdose. Read the labels on your OTC medicines with care. This will help you to clearly know the list of ingredients, how much to take, and any warnings. It may also help you not take too much acetaminophen. If you have questions or don't understand the information, ask your pharmacist or healthcare provider to explain it to you before you take the OTC medicine.   Managing nausea  Some people have an upset stomach (nausea) after surgery. This is often  because of anesthesia, pain, or pain medicine, less movement of food in the stomach, or the stress of surgery. These tips will help you handle nausea and eat healthy foods as you get better. If you were on a special food plan before surgery, ask your healthcare provider if you should follow it while you get better. Check with your provider on how your eating should progress. It may depend on the surgery you had. These general tips may help:   Don't push yourself to eat. Your body will tell you when to eat and how much.  Start off with clear liquids and soup. They're easier to digest.  Next try semi-solid foods as you feel ready. These include mashed potatoes, applesauce, and gelatin.  Slowly move to solid foods. Don’t eat fatty, rich, or spicy foods at first.  Don't force yourself to have 3 large meals a day. Instead eat smaller amounts more often.  Take pain medicines with a small amount of solid food, such as crackers or toast. This helps prevent nausea.  When to call your healthcare provider  Call your healthcare provider right away if you have any of these:   You still have too much pain, or the pain gets worse, after taking the medicine. The medicine may not be strong enough. Or there may be a complication from the surgery.  You feel too sleepy, dizzy, or groggy. The medicine may be too strong.  Side effects such as nausea or vomiting. Your healthcare provider may advise taking other medicines to .  Skin changes such as rash, itching, or hives. This may mean you have an allergic reaction. Your provider may advise taking other medicines.  The incision looks different (for instance, part of it opens up).  Bleeding or fluid leaking from the incision site, and weren't told to expect that.  Fever of 100.4°F (38°C) or higher, or as directed by your provider.  Call 911  Call 911 right away if you have:   Trouble breathing  Facial swelling    If you have obstructive sleep apnea   You were given anesthesia medicine  during surgery to keep you comfortable and free of pain. After surgery, you may have more apnea spells because of this medicine and other medicines you were given. The spells may last longer than normal.    At home:  Keep using the continuous positive airway pressure (CPAP) device when you sleep. Unless your healthcare provider tells you not to, use it when you sleep, day or night. CPAP is a common device used to treat obstructive sleep apnea.  Talk with your provider before taking any pain medicine, muscle relaxants, or sedatives. Your provider will tell you about the possible dangers of taking these medicines.  Contact your provider if your sleeping changes a lot even when taking medicines as directed.  [employer] last reviewed this educational content on 10/1/2021  © 6999-9966 [employer]. All rights reserved. This information is not intended as a substitute for professional medical care. Always follow your healthcare professional's instructions.

## 2024-04-12 ENCOUNTER — ANESTHESIA (OUTPATIENT)
Dept: SURGERY | Facility: HOSPITAL | Age: 44
End: 2024-04-12
Payer: COMMERCIAL

## 2024-04-12 ENCOUNTER — HOSPITAL ENCOUNTER (OUTPATIENT)
Facility: HOSPITAL | Age: 44
Setting detail: HOSPITAL OUTPATIENT SURGERY
Discharge: HOME | End: 2024-04-12
Attending: OBSTETRICS & GYNECOLOGY | Admitting: OBSTETRICS & GYNECOLOGY
Payer: COMMERCIAL

## 2024-04-12 ENCOUNTER — ANESTHESIA EVENT (OUTPATIENT)
Dept: SURGERY | Facility: HOSPITAL | Age: 44
End: 2024-04-12
Payer: COMMERCIAL

## 2024-04-12 VITALS
DIASTOLIC BLOOD PRESSURE: 72 MMHG | OXYGEN SATURATION: 98 % | BODY MASS INDEX: 29.35 KG/M2 | RESPIRATION RATE: 18 BRPM | HEART RATE: 63 BPM | WEIGHT: 187 LBS | SYSTOLIC BLOOD PRESSURE: 125 MMHG | HEIGHT: 67 IN | TEMPERATURE: 98 F

## 2024-04-12 DIAGNOSIS — N84.0 ENDOMETRIAL POLYP: ICD-10-CM

## 2024-04-12 DIAGNOSIS — N93.9 ABNORMAL UTERINE BLEEDING (AUB): ICD-10-CM

## 2024-04-12 DIAGNOSIS — D64.9 ACUTE ON CHRONIC ANEMIA: ICD-10-CM

## 2024-04-12 LAB
ANTIBODY SCREEN: NEGATIVE
B-HCG UR QL: NEGATIVE
DEPRECATED RDW RBC AUTO: 66.5 FL (ref 35.1–46.3)
ERYTHROCYTE [DISTWIDTH] IN BLOOD BY AUTOMATED COUNT: 26.8 % (ref 11–15)
HCT VFR BLD AUTO: 34.3 %
HGB BLD-MCNC: 10.5 G/DL
MCH RBC QN AUTO: 21.5 PG (ref 26–34)
MCHC RBC AUTO-ENTMCNC: 30.6 G/DL (ref 31–37)
MCV RBC AUTO: 70.1 FL
PLATELET # BLD AUTO: 437 10(3)UL (ref 150–450)
RBC # BLD AUTO: 4.89 X10(6)UL
RH BLOOD TYPE: POSITIVE
WBC # BLD AUTO: 6.6 X10(3) UL (ref 4–11)

## 2024-04-12 PROCEDURE — 0U5B8ZZ DESTRUCTION OF ENDOMETRIUM, VIA NATURAL OR ARTIFICIAL OPENING ENDOSCOPIC: ICD-10-PCS | Performed by: OBSTETRICS & GYNECOLOGY

## 2024-04-12 PROCEDURE — 58563 HYSTEROSCOPY ABLATION: CPT | Performed by: OBSTETRICS & GYNECOLOGY

## 2024-04-12 RX ORDER — MORPHINE SULFATE 4 MG/ML
4 INJECTION, SOLUTION INTRAMUSCULAR; INTRAVENOUS EVERY 10 MIN PRN
Status: DISCONTINUED | OUTPATIENT
Start: 2024-04-12 | End: 2024-04-12

## 2024-04-12 RX ORDER — LIDOCAINE HYDROCHLORIDE 10 MG/ML
INJECTION, SOLUTION EPIDURAL; INFILTRATION; INTRACAUDAL; PERINEURAL AS NEEDED
Status: DISCONTINUED | OUTPATIENT
Start: 2024-04-12 | End: 2024-04-12 | Stop reason: SURG

## 2024-04-12 RX ORDER — MORPHINE SULFATE 10 MG/ML
6 INJECTION, SOLUTION INTRAMUSCULAR; INTRAVENOUS EVERY 10 MIN PRN
Status: DISCONTINUED | OUTPATIENT
Start: 2024-04-12 | End: 2024-04-12

## 2024-04-12 RX ORDER — SODIUM CHLORIDE, SODIUM LACTATE, POTASSIUM CHLORIDE, CALCIUM CHLORIDE 600; 310; 30; 20 MG/100ML; MG/100ML; MG/100ML; MG/100ML
INJECTION, SOLUTION INTRAVENOUS CONTINUOUS
Status: DISCONTINUED | OUTPATIENT
Start: 2024-04-12 | End: 2024-04-12

## 2024-04-12 RX ORDER — HYDROMORPHONE HYDROCHLORIDE 1 MG/ML
0.6 INJECTION, SOLUTION INTRAMUSCULAR; INTRAVENOUS; SUBCUTANEOUS EVERY 5 MIN PRN
Status: DISCONTINUED | OUTPATIENT
Start: 2024-04-12 | End: 2024-04-12

## 2024-04-12 RX ORDER — ACETAMINOPHEN 325 MG/1
325 TABLET ORAL EVERY 6 HOURS PRN
Qty: 60 TABLET | Refills: 0 | Status: SHIPPED | OUTPATIENT
Start: 2024-04-12

## 2024-04-12 RX ORDER — ONDANSETRON 4 MG/1
4 TABLET, FILM COATED ORAL EVERY 8 HOURS PRN
Status: DISCONTINUED | OUTPATIENT
Start: 2024-04-12 | End: 2024-04-12

## 2024-04-12 RX ORDER — ONDANSETRON 2 MG/ML
4 INJECTION INTRAMUSCULAR; INTRAVENOUS EVERY 8 HOURS PRN
Status: DISCONTINUED | OUTPATIENT
Start: 2024-04-12 | End: 2024-04-12

## 2024-04-12 RX ORDER — HYDROMORPHONE HYDROCHLORIDE 1 MG/ML
0.4 INJECTION, SOLUTION INTRAMUSCULAR; INTRAVENOUS; SUBCUTANEOUS EVERY 5 MIN PRN
Status: DISCONTINUED | OUTPATIENT
Start: 2024-04-12 | End: 2024-04-12

## 2024-04-12 RX ORDER — MIDAZOLAM HYDROCHLORIDE 1 MG/ML
INJECTION INTRAMUSCULAR; INTRAVENOUS AS NEEDED
Status: DISCONTINUED | OUTPATIENT
Start: 2024-04-12 | End: 2024-04-12 | Stop reason: SURG

## 2024-04-12 RX ORDER — ACETAMINOPHEN 500 MG
1000 TABLET ORAL ONCE
Status: COMPLETED | OUTPATIENT
Start: 2024-04-12 | End: 2024-04-12

## 2024-04-12 RX ORDER — HYDROMORPHONE HYDROCHLORIDE 1 MG/ML
0.2 INJECTION, SOLUTION INTRAMUSCULAR; INTRAVENOUS; SUBCUTANEOUS EVERY 5 MIN PRN
Status: DISCONTINUED | OUTPATIENT
Start: 2024-04-12 | End: 2024-04-12

## 2024-04-12 RX ORDER — KETOROLAC TROMETHAMINE 30 MG/ML
INJECTION, SOLUTION INTRAMUSCULAR; INTRAVENOUS AS NEEDED
Status: DISCONTINUED | OUTPATIENT
Start: 2024-04-12 | End: 2024-04-12 | Stop reason: SURG

## 2024-04-12 RX ORDER — DEXAMETHASONE SODIUM PHOSPHATE 4 MG/ML
VIAL (ML) INJECTION AS NEEDED
Status: DISCONTINUED | OUTPATIENT
Start: 2024-04-12 | End: 2024-04-12 | Stop reason: SURG

## 2024-04-12 RX ORDER — MORPHINE SULFATE 4 MG/ML
2 INJECTION, SOLUTION INTRAMUSCULAR; INTRAVENOUS EVERY 10 MIN PRN
Status: DISCONTINUED | OUTPATIENT
Start: 2024-04-12 | End: 2024-04-12

## 2024-04-12 RX ORDER — IBUPROFEN 600 MG/1
600 TABLET ORAL EVERY 6 HOURS PRN
Qty: 60 TABLET | Refills: 0 | Status: SHIPPED | OUTPATIENT
Start: 2024-04-12

## 2024-04-12 RX ORDER — ONDANSETRON 2 MG/ML
INJECTION INTRAMUSCULAR; INTRAVENOUS AS NEEDED
Status: DISCONTINUED | OUTPATIENT
Start: 2024-04-12 | End: 2024-04-12 | Stop reason: SURG

## 2024-04-12 RX ORDER — NALOXONE HYDROCHLORIDE 0.4 MG/ML
0.08 INJECTION, SOLUTION INTRAMUSCULAR; INTRAVENOUS; SUBCUTANEOUS AS NEEDED
Status: DISCONTINUED | OUTPATIENT
Start: 2024-04-12 | End: 2024-04-12

## 2024-04-12 RX ADMIN — KETOROLAC TROMETHAMINE 30 MG: 30 INJECTION, SOLUTION INTRAMUSCULAR; INTRAVENOUS at 09:50:00

## 2024-04-12 RX ADMIN — SODIUM CHLORIDE, SODIUM LACTATE, POTASSIUM CHLORIDE, CALCIUM CHLORIDE: 600; 310; 30; 20 INJECTION, SOLUTION INTRAVENOUS at 10:08:00

## 2024-04-12 RX ADMIN — LIDOCAINE HYDROCHLORIDE 50 MG: 10 INJECTION, SOLUTION EPIDURAL; INFILTRATION; INTRACAUDAL; PERINEURAL at 09:14:00

## 2024-04-12 RX ADMIN — MIDAZOLAM HYDROCHLORIDE 2 MG: 1 INJECTION INTRAMUSCULAR; INTRAVENOUS at 09:11:00

## 2024-04-12 RX ADMIN — DEXAMETHASONE SODIUM PHOSPHATE 4 MG: 4 MG/ML VIAL (ML) INJECTION at 09:11:00

## 2024-04-12 RX ADMIN — ONDANSETRON 4 MG: 2 INJECTION INTRAMUSCULAR; INTRAVENOUS at 09:11:00

## 2024-04-12 NOTE — ANESTHESIA POSTPROCEDURE EVALUATION
Patient: Katia Emanuel    Procedure Summary       Date: 04/12/24 Room / Location: [facility] MAIN OR  / [facility] MAIN OR    Anesthesia Start: 0909 Anesthesia Stop:     Procedure: Hysteroscopy TruClear endometrial polypectomy, Novasure endometrial ablation (Vagina ) Diagnosis:       Abnormal uterine bleeding (AUB)      Endometrial polyp      Acute on chronic anemia      (Abnormal uterine bleeding (AUB) [N93.9]Endometrial polyp [N84.0]Acute on chronic anemia [D64.9])    Surgeons: Niraj Osorio MD Anesthesiologist: Jerome Chauhan MD    Anesthesia Type: general ASA Status: 2            Anesthesia Type: general    Vitals Value Taken Time   /84 04/12/24 1008   Temp 97.5 °F (36.4 °C) 04/12/24 1008   Pulse 62 04/12/24 1008   Resp 16 04/12/24 1008   SpO2 99 % 04/12/24 1008   Vitals shown include unfiled device data.    [facility] AN Post Evaluation:   Patient Evaluated in PACU  Patient Participation: complete - patient participated  Level of Consciousness: sleepy but conscious  Pain Score: 0  Pain Management: adequate  Airway Patency:patent  Dental exam unchanged from preop  Yes    Nausea/Vomiting: none  Cardiovascular Status: blood pressure returned to baseline  Respiratory Status: nasal cannula  Postoperative Hydration acceptable      Miriam Chavez CRNA  4/12/2024 10:09 AM

## 2024-04-12 NOTE — ANESTHESIA PROCEDURE NOTES
Airway  Date/Time: 4/12/2024 9:18 AM  Urgency: Elective    Airway not difficult    General Information and Staff    Patient location during procedure: OR  Anesthesiologist: Jerome Chauhan MD  Resident/CRNA: Miriam Chavez CRNA  Performed: CRNA   Performed by: Miriam Chavez CRNA  Authorized by: Jerome Chauhan MD      Indications and Patient Condition  Indications for airway management: anesthesia  Sedation level: deep  Preoxygenated: yes  Patient position: sniffing  Mask difficulty assessment: 1 - vent by mask    Final Airway Details  Final airway type: supraglottic airway      Successful airway: classic (LUBRICATED)  Size 4       Number of attempts at approach: 2  Ventilation between attempts: BVM    Additional Comments  TIGHT JAW, REQUIRING ADDITIONAL PROPOFOL AND INHALATION AGENT FOR RELAXATION. ONCE JAW OPENED, EASY/ATRAUMATIC PLACEMENT OF LMA. (+) ETCO2 AND BBSE. LMA SECURED.

## 2024-04-12 NOTE — OPERATIVE REPORT
Operative Report:       Katia Emanuel  22      INDICATIONS:   Patient is a 44 year old  with abnormal uterine bleeding, and ultrasound evidence of endometrial polyp.     PRE-OP DIAGNOSIS:   Abnormal uterine bleeding   Endometrial polyp       POST-OP DIAGNOSIS:   Same     PROCEDURE(S):   1) Hysteroscopy Arden Clear polypectomy, Novasure endometrial ablation.     ANESTHESIA: General anesthesia     SURGEON(S): Dr. Niraj Osorio MD     ESTIMATED BLOOD LOSS: 10 mL           DRAINS: No urinary output collected.     UTERINE DISTENSION MEDIUM: Normal Saline 0.9%  DEFICIT:190 mL     SPECIMENS: 1) Endometrial polyps                    COMPLICATIONS:  None    FINDINGS: Normal external genitalia, no lesions. Normal cervix, no lesions.  Anteverted uterus. Cervix dilated to 6 mm.  Uterus sounded to 10 cm.  Multiple  endometrial polyps. Removed all. Endometrial ablation performed. Specimens sent to pathology. Good hemostasis.     PROCEDURE: This procedure was fully reviewed with the patient and written informed consent was obtained after discussing risks, benefits, indication and alternatives. All questions were answered.     The patient was taken to operating room and  general anesthesia was initiated. She was placed in dorsal lithotomy position. She was prepped and draped in normal sterile fashion. The bladder was not emptied. A sterile bivalve speculum was placed in the vagina and the cervix was visualized. A single tooth tenaculum was used to grasp the anterior lip of the cervix. The cervix was gently dilated with hegar dilators to 6 mm. Uterus was sounded to 10 cm.   After the hysteroscope system was calibrated, the hysteroscope was then gently advanced into the endometrial cavity. The uterine cavity was visualized and the previous findings noted. The ostia were visualized bilaterally. The Arden Clear hysteroscopic blade was then advanced through the hysteroscope under direct visualization. The blade was placed  against the polypoid tissue and the system was activated by stepping on one pedal. The Arden Clear hysteroscopic blade simultaneously aspirated and cut the polypod tissue without any complications. This was repeat along the anterior, posterior and lateral surface of the endometrial cavity for a thorough sampling of the endometrium. Hysteroscope removed.     Cavity measurements performed. Uterus sounded to 10 cm. Cervical length of 4 cm. Cavity length input of 6 cm. Novasure device placed. Cavity width of 4.5 cm. Novasure device placed, cervical collar pushed toward cervix. Cavity assessment performed and passed. Ablation performed with 149 W for 56 seconds. Novasure device removed. Noted lyndsey on device.     The single tooth tenaculum was removed and good hemostasis was noted. All instruments were removed from the patient. Sponge, lap, needle, and instrument counts correct by two counts. The patient was awaken from general anesthesia, extubated, and taken to recovery room in stable condition. She was instructed to avoid anything in vagina for two weeks.       DISPOSITION:  To recovery room in stable condition        CONDITION: Stable      Dr. Niraj Osorio MD    [facility]     This note was created by [employer] voice recognition. Errors in content may be related to improper recognition by the system; efforts to review and correct have been done but errors may still exist. Please be advised the primary purpose of this note is for me to communicate medical care. Standard sentence structure is not always used. Medical terminology and medical abbreviations may be used. There may be grammatical, typographical, and automated fill ins that may have errors missed in proofreading.

## 2024-04-12 NOTE — ANESTHESIA PREPROCEDURE EVALUATION
Anesthesia PreOp Note    HPI:     Katia Emanuel is a 44 year old female who presents for preoperative consultation requested by: Niraj Osorio MD    Date of Surgery: 4/12/2024    Procedure(s):  Hysteroscopy TruClear endometrial polypectomy, Novasure endometrial ablation  Indication: Abnormal uterine bleeding (AUB) [N93.9]  Endometrial polyp [N84.0]  Acute on chronic anemia [D64.9]    Relevant Problems   No relevant active problems       NPO:  Last Liquid Consumption Date: 04/11/24  Last Liquid Consumption Time: 2030  Last Solid Consumption Date: 04/11/24  Last Solid Consumption Time: 2030  Last Liquid Consumption Date: 04/11/24          History Review:  Patient Active Problem List    Diagnosis Date Noted    Acute on chronic anemia 03/21/2024    Endometrial polyp 03/21/2024    Abnormal uterine bleeding (AUB) 03/21/2024    Chronic bilateral low back pain without sciatica 07/20/2019       Past Medical History:    Anemia    Back problem    scoliosis    History of blood transfusion    Hypothyroidism    age [age] w/ meds during this time; had radiation txt and benign biopsy of nodules.     Iron (Fe) deficiency anemia       Past Surgical History:   Procedure Laterality Date    Cholecystectomy      Removal gallbladder      Tubal ligation         Medications Prior to Admission   Medication Sig Dispense Refill Last Dose    ferrous sulfate 325 (65 FE) MG Oral Tab EC Take 1 tablet (325 mg total) by mouth daily with breakfast.   4/10/2024     Current Facility-Administered Medications Ordered in Epic   Medication Dose Route Frequency Provider Last Rate Last Admin    lactated ringers infusion   Intravenous Continuous Niraj Osorio MD 20 mL/hr at 04/12/24 0748 New Bag at 04/12/24 0748     No current [facility]-ordered outpatient medications on file.       Allergies   Allergen Reactions    Fish-Derived Products RASH    Other RASH     Sweet potato       Family History   Problem Relation Age of Onset    Diabetes Father     Prostate Cancer  Father         colon cancer    Diabetes Sister     Other (Cervical Cancer) Sister         cervical cancer     Social History     Socioeconomic History    Marital status:    Tobacco Use    Smoking status: Never    Smokeless tobacco: Never   Vaping Use    Vaping status: Never Used   Substance and Sexual Activity    Alcohol use: No    Drug use: Never    Sexual activity: Yes     Partners: Male   Other Topics Concern    Right Handed Yes    Blood Transfusions Yes       Available pre-op labs reviewed.  Lab Results   Component Value Date    WBC 7.0 02/21/2024    RBC 4.64 02/21/2024    HGB 7.7 (L) 02/21/2024    HCT 29.1 (L) 02/21/2024    MCV 62.7 (L) 02/21/2024    MCH 16.6 (L) 02/21/2024    MCHC 26.5 (L) 02/21/2024    RDW 21.3 (H) 02/21/2024    .0 (H) 02/21/2024             Vital Signs:  Body mass index is 29.29 kg/m².   height is 1.702 m (5' 7\") and weight is 84.8 kg (187 lb). Her oral temperature is 98 °F (36.7 °C). Her blood pressure is 122/82 and her pulse is 72. Her respiration is 18 and oxygen saturation is 100%.   Vitals:    04/08/24 1204 04/12/24 0746   BP:  122/82   Pulse:  72   Resp:  18   Temp:  98 °F (36.7 °C)   TempSrc:  Oral   SpO2:  100%   Weight: 86.2 kg (190 lb) 84.8 kg (187 lb)   Height: 1.702 m (5' 7\") 1.702 m (5' 7\")        Anesthesia Evaluation     Patient summary reviewed and Nursing notes reviewed    No history of anesthetic complications   Airway   Mallampati: II  TM distance: >3 FB  Neck ROM: full  Dental          Pulmonary - negative ROS and normal exam   Cardiovascular - negative ROS and normal exam  Exercise tolerance: good    Neuro/Psych - negative ROS     GI/Hepatic/Renal - negative ROS     Endo/Other    (+) hypothyroidism    Comments: anemia  Abdominal                  Anesthesia Plan:   ASA:  2  Plan:   General  Airway:  LMA  Post-op Pain Management: IV analgesics and Oral pain medication  Plan Comments: I have discussed the anesthetic plan, major risks and alternatives with the  patient and answered all questions. The patient desires to proceed with surgery and anesthesia as planned.     Last hgb 7.7, repeat CBC and T+S ordered preop  Informed Consent Plan and Risks Discussed With:  Patient and child/children  Discussed plan with:  CRNA      I have informed Katia Emanuel and/or legal guardian or family member of the nature of the anesthetic plan, benefits, risks including possible dental damage if relevant, major complications, and any alternative forms of anesthetic management.   All of the patient's questions were answered to the best of my ability. The patient desires the anesthetic management as planned.  Jerome Chauhan MD  4/12/2024 8:03 AM  Present on Admission:  **None**

## 2024-04-12 NOTE — INTERVAL H&P NOTE
The H&P was reviewed by me. Patient was examined and no significant changes noted in patient's condition since H&P was performed. Discussed with patient and/or legal representative the potential benefits, risks and side effects of procedure; likelihood of patient achieving goals; and potential problems that might occur. Discussed reasonable alternatives to procedure, including risks, benefits and side effects related to the alternatives and risks. We will proceed with procedure as planned.

## 2024-05-01 ENCOUNTER — OFFICE VISIT (OUTPATIENT)
Dept: OBGYN CLINIC | Facility: CLINIC | Age: 44
End: 2024-05-01
Payer: COMMERCIAL

## 2024-05-01 VITALS
WEIGHT: 187.63 LBS | SYSTOLIC BLOOD PRESSURE: 122 MMHG | HEIGHT: 67 IN | DIASTOLIC BLOOD PRESSURE: 74 MMHG | BODY MASS INDEX: 29.45 KG/M2

## 2024-05-01 DIAGNOSIS — Z09 POSTOPERATIVE EXAMINATION: Primary | ICD-10-CM

## 2024-05-01 DIAGNOSIS — Z12.31 BREAST CANCER SCREENING BY MAMMOGRAM: ICD-10-CM

## 2024-05-01 PROCEDURE — 99213 OFFICE O/P EST LOW 20 MIN: CPT | Performed by: OBSTETRICS & GYNECOLOGY

## 2024-05-01 NOTE — PROGRESS NOTES
[facility]  Obstetrics and Gynecology   Post op incision check progress note    Subjective:     Katia Emanuel is a 44 year old  female who is s/p Hysteroscopy endometrial polypectomy and endometrial ablation on 24. Her surgery was uncomplicated. She noted her pain is well controlled. She tolerates PO intake without nausea and vomiting. She is voiding without complaints, passing gas and having regular bowel movements. She continues lifting and work restrictions.  Bleeding has stopped. Noted mild spotting for about 1-2 weeks.     Review of Systems:  General: no complaints  Eyes: no complaints  Respiratory: no complaints  Cardiovascular: no complaints  GI: no complaints   See HPI  Hematological/lymphatic: no complaints  Breast: no complaints  Psychiatric: no complaints  Endocrine:no complaints  Neurological: no complaints  Immunological: no complaints  Musculoskeletal:no complaints      Objective:     Vitals:    24 1220   BP: 122/74   Weight: 187 lb 9.6 oz (85.1 kg)   Height: 67\"         Body mass index is 29.38 kg/m².    GENERAL: well developed, well nourished, in no apparent distress, alert and orientated X 3  PSYCH: mood and affect stable   SKIN: no rashes, no lesions  LUNGS: respiration unlabored  CARDIOVASCULAR: no peripheral edema or varicosities, skin warm and dry  Pelvic exam: deferred   Ext: non-tender, no edema    Labs:       Pathology review:  Benign polyps.     Assessment:     Katia Emanuel is a 44 year old  female who presents for wound check     Patient Active Problem List   Diagnosis    Chronic bilateral low back pain without sciatica    Acute on chronic anemia    Endometrial polyp    Abnormal uterine bleeding (AUB)         Plan:     Post op exam   - s/p Hysteroscopy endometrial polypectomy and endometrial ablation on 24  - doing well, no complaints   - no abnormal findings on physical exam   - Discontinue postoperative restrictions including  nothing per vagina and no heavy lifting.     1. Breast cancer screening by mammogram  - [facility] MYRNA 2D+3D SCREENING BILAT (CPT=77067/24388); Future     All of the findings and plan were discussed with the patient.  She notes understanding and agrees with the plan of care.  All questions were answered to the best of my ability at this time.    RTC in 09/2025 for annual. Mammogram ordered for March 2025.     MD MARLON Lin OBGYN

## 2024-05-28 ENCOUNTER — HOSPITAL ENCOUNTER (OUTPATIENT)
Dept: CT IMAGING | Facility: HOSPITAL | Age: 44
Discharge: HOME | End: 2024-05-28
Attending: INTERNAL MEDICINE

## 2024-05-28 ENCOUNTER — OFFICE VISIT (OUTPATIENT)
Dept: INTERNAL MEDICINE CLINIC | Facility: CLINIC | Age: 44
End: 2024-05-28

## 2024-05-28 VITALS
SYSTOLIC BLOOD PRESSURE: 104 MMHG | DIASTOLIC BLOOD PRESSURE: 78 MMHG | HEART RATE: 96 BPM | OXYGEN SATURATION: 98 % | HEIGHT: 67 IN | WEIGHT: 188.19 LBS | BODY MASS INDEX: 29.54 KG/M2

## 2024-05-28 DIAGNOSIS — R10.31 RLQ ABDOMINAL PAIN: ICD-10-CM

## 2024-05-28 DIAGNOSIS — R10.31 RLQ ABDOMINAL PAIN: Primary | ICD-10-CM

## 2024-05-28 PROCEDURE — 99214 OFFICE O/P EST MOD 30 MIN: CPT | Performed by: INTERNAL MEDICINE

## 2024-05-28 PROCEDURE — 74176 CT ABD & PELVIS W/O CONTRAST: CPT | Performed by: INTERNAL MEDICINE

## 2024-05-28 RX ORDER — POLYETHYLENE GLYCOL 3350 17 G/17G
17 POWDER, FOR SOLUTION ORAL DAILY
Qty: 20 EACH | Refills: 1 | Status: SHIPPED | OUTPATIENT
Start: 2024-05-28

## 2024-05-28 NOTE — PROGRESS NOTES
[facility]  Return Patient Progress Note      HPI:     Chief Complaint   Patient presents with    Abdominal Pain     Abdominal pain, right side       Katia Emanuel is a 44 year old female presenting for follow up  Has a significant  has a past medical history of Anemia, Back problem, History of blood transfusion (12/2022), Hypothyroidism, and Iron (Fe) deficiency anemia.     Reports RLQ pelvic.abd pain.  Started over the weekend.  Tender to palpation.      Results for orders placed or performed during the hospital encounter of 04/12/24   CBC, Platelet; No Differential   Result Value Ref Range    WBC 6.6 4.0 - 11.0 x10(3) uL    RBC 4.89 3.80 - 5.30 x10(6)uL    HGB 10.5 (L) 12.0 - 16.0 g/dL    HCT 34.3 (L) 35.0 - 48.0 %    MCV 70.1 (L) 80.0 - 100.0 fL    MCH 21.5 (L) 26.0 - 34.0 pg    MCHC 30.6 (L) 31.0 - 37.0 g/dL    RDW 26.8 (H) 11.0 - 15.0 %    RDW-SD 66.5 (H) 35.1 - 46.3 fL    .0 150.0 - 450.0 10(3)uL   ABORH (Blood Type)   Result Value Ref Range    ABO BLOOD TYPE A     RH BLOOD TYPE Positive    Antibody Screen   Result Value Ref Range    Antibody Screen Negative    Specimen to Pathology Tissue   Result Value Ref Range    Case Report       Surgical Pathology                                Case: [id number]                                  Authorizing Provider:  Niraj Osorio MD           Collected:           04/12/2024 09:46 AM          Ordering Location:     [facility]          Received:            04/12/2024 10:32 AM                                 Operating Room                                                               Pathologist:           Phil Mg MD                                                             Specimen:    Endometrial polyp, 1. Endometrial polyp                                                    Final Diagnosis:         Endometrial polyp:   Polypoid fragments of benign early secretory phase endometrial glands and stroma, consistent with  endometrial polyp.  No malignancy is identified.         Embedded Images      Clinical Information       N93.9 Abnormal Uterine Bleeding (Aub).  N84.0 Endometrial Polyp.  D64.9 Acute On Chronic Anemia.         Gross Description       The specimen is labeled \"Adelfo, endometrial polyp\" received in formalin. The specimen consists of multiple fragments of pink-tan soft tissue measuring in aggregate 5.8 x 5.4 x 0.7 cm. The specimen is submitted entirely in cassettes A1-A4. (jq)      Phil Mg M.D./mtt       Interpretation Benign     POCT Pregnancy, Urine   Result Value Ref Range    POCT Urine Pregnancy Negative Negative       Labs:   CMP:  Lab Results   Component Value Date/Time     09/07/2022 12:16 PM    K 4.0 09/07/2022 12:16 PM     09/07/2022 12:16 PM    CO2 19.0 (L) 09/07/2022 12:16 PM    CREATSERUM 0.63 09/07/2022 12:16 PM    CA 9.3 09/07/2022 12:16 PM    GLU 66 (L) 09/07/2022 12:16 PM    TP 7.7 09/07/2022 12:16 PM    ALB 4.1 09/07/2022 12:16 PM    ALKPHO 86 09/07/2022 12:16 PM    AST 18 09/07/2022 12:16 PM    ALT 27 09/07/2022 12:16 PM    BILT 0.4 09/07/2022 12:16 PM        Hemoglobin A1C, Microalbumin  Lab Results   Component Value Date/Time    A1C 5.4 09/07/2022 12:16 PM        Lipid panel  Lab Results   Component Value Date/Time    CHOLEST 195 09/07/2022 12:16 PM    HDL 52 09/07/2022 12:16 PM    TRIG 95 09/07/2022 12:16 PM     (H) 09/07/2022 12:16 PM    NONHDLC 143 (H) 09/07/2022 12:16 PM        Medications:  Current Outpatient Medications   Medication Sig Dispense Refill    ferrous sulfate 325 (65 FE) MG Oral Tab EC Take 1 tablet (325 mg total) by mouth daily with breakfast.      ibuprofen 600 MG Oral Tab Take 1 tablet (600 mg total) by mouth every 6 (six) hours as needed for Pain. (Patient not taking: Reported on 5/1/2024) 60 tablet 0    acetaminophen 325 MG Oral Tab Take 1 tablet (325 mg total) by mouth every 6 (six) hours as needed for Pain. (Patient not taking: Reported on 5/1/2024)  60 tablet 0      PMH:  Past Medical History:    Anemia    Back problem    scoliosis    History of blood transfusion    Hypothyroidism    age [age] w/ meds during this time; had radiation txt and benign biopsy of nodules.     Iron (Fe) deficiency anemia         PSH:  Past Surgical History:   Procedure Laterality Date    Cholecystectomy      Removal gallbladder      Tubal ligation         Allergies:  Allergies   Allergen Reactions    Fish-Derived Products RASH    Other RASH     Sweet potato      Social History:  Social History     Socioeconomic History    Marital status:    Tobacco Use    Smoking status: Never    Smokeless tobacco: Never   Vaping Use    Vaping status: Never Used   Substance and Sexual Activity    Alcohol use: No    Drug use: Never    Sexual activity: Yes     Partners: Male   Other Topics Concern    Right Handed Yes    Blood Transfusions Yes   Social History Narrative    Feels safe        Family History:  Family History   Problem Relation Age of Onset    Diabetes Father     Prostate Cancer Father         colon cancer    Diabetes Sister     Other (Cervical Cancer) Sister         cervical cancer              REVIEW OF SYSTEMS:   Review of Systems   Constitutional:  Negative for chills, fatigue, fever and unexpected weight change.   HENT:  Negative for congestion, ear pain, hearing loss, rhinorrhea and sore throat.    Eyes:  Negative for pain, redness and visual disturbance.   Respiratory:  Negative for apnea, cough, chest tightness, shortness of breath and wheezing.    Cardiovascular:  Negative for chest pain, palpitations and leg swelling.   Gastrointestinal:  Positive for abdominal pain. Negative for nausea, constipation, blood in stool and abdominal distention.   Endocrine: Negative for cold intolerance, heat intolerance and polyuria.   Genitourinary:  Negative for dysuria, urgency and hematuria.   Musculoskeletal:  Negative for myalgias, back pain, joint swelling, joint pain, gait problem and  neck pain.   Skin:  Negative for rash and wound.   Allergic/Immunologic: Negative for food allergies and immunocompromised state.   Neurological:  Negative for dizziness, seizures, facial asymmetry, speech difficulty, weakness, light-headedness, numbness and headaches.   Hematological:  Negative for adenopathy. Does not bruise/bleed easily.   Psychiatric/Behavioral:  Negative for suicidal ideas, behavioral problems and sleep disturbance. The patient is not nervous/anxious.             PHYSICAL EXAM:   /78   Pulse 96   Ht 5' 7\" (1.702 m)   Wt 188 lb 3.2 oz (85.4 kg)   LMP 03/28/2024 (Exact Date)   SpO2 98%   BMI 29.48 kg/m²  Estimated body mass index is 29.48 kg/m² as calculated from the following:    Height as of this encounter: 5' 7\" (1.702 m).    Weight as of this encounter: 188 lb 3.2 oz (85.4 kg).     Wt Readings from Last 3 Encounters:   05/28/24 188 lb 3.2 oz (85.4 kg)   05/01/24 187 lb 9.6 oz (85.1 kg)   04/12/24 187 lb (84.8 kg)       Physical Exam   Vitals reviewed.   Constitutional: She is oriented to person, place, and time. She appears well-developed and well-nourished. No distress.   HENT:   Head: Normocephalic and atraumatic.   Mouth/Throat: Oropharynx is clear and moist.   Eyes: Pupils are equal, round, and reactive to light. Conjunctivae and EOM are normal.   Neck: No thyromegaly present.   Cardiovascular:  Normal rate, regular rhythm, S1 normal, S2 normal, normal heart sounds and intact distal pulses.      Exam reveals no gallop and no friction rub.       No murmur heard.   Edema not present.  Pulmonary/Chest: Effort normal and breath sounds normal. No respiratory distress. She has no wheezes. She has no rales. She exhibits no tenderness.   Abdominal: Soft. Bowel sounds are normal. She exhibits no distension and no mass. There is no hepatosplenomegaly. There is abdominal tenderness. There is no rebound and no guarding.   Genitourinary:    Rectum normal.   Musculoskeletal: Normal range of  motion. She exhibits no tenderness.   Lymphadenopathy:     She has no cervical adenopathy.   Neurological: She is alert and oriented to person, place, and time. She has normal reflexes. No cranial nerve deficit.   Skin: Skin is warm. No rash noted. No erythema.   Psychiatric: She has a normal mood and affect. Her behavior is normal. Judgment and thought content normal.           ASSESSMENT AND PLAN:   Patient is a 44 year old female who presents primarily presents for:          (R10.31) RLQ abdominal pain  (primary encounter diagnosis)  Plan: CT ABDOMEN+PELVIS(CPT=74176)                  (K59.00) Constipation, unspecified constipation type  Comment: Miralax sent                Meds & Refills for this Visit:  Requested Prescriptions      No prescriptions requested or ordered in this encounter       No orders of the defined types were placed in this encounter.      Imaging & Consults:  None          No follow-ups on file.  Important follow up notes/labs for next visit: Back pain.        Patient indicates understanding of the above recommendations and agrees to the above plan.  Reasurrance and education provided. All questions answered.  Notified to call with any questions, complications, allergies, or worsening or changing symptoms as well as any side effects or complications from the treatments .  Red flags/ ER precautions discussed.    Total time spent was 30 min in addition to physical which includes: Preparation to see patient including chart review, reviewing appropriate medical history, counseling and education (diet and exercise), discussing treatment options, ordering appropriate diagnostic tests and documentation.    Rik Fenton MD  Internal Medicine/Primary Care  [facility]

## 2024-07-22 ENCOUNTER — PATIENT MESSAGE (OUTPATIENT)
Dept: INTERNAL MEDICINE CLINIC | Facility: CLINIC | Age: 44
End: 2024-07-22

## 2024-07-22 NOTE — TELEPHONE ENCOUNTER
Responded to pt's GLP-1 Rx questions via [employer] message. Pt is non-diabetic. Pt will call her insurance co to determine if any wt loss drugs are covered and if yes- check specifically of Wegovy or Zepbound are and will let PCP know.

## 2024-11-18 ENCOUNTER — OFFICE VISIT (OUTPATIENT)
Dept: INTERNAL MEDICINE CLINIC | Facility: CLINIC | Age: 44
End: 2024-11-18
Payer: COMMERCIAL

## 2024-11-18 VITALS
HEART RATE: 78 BPM | HEIGHT: 67 IN | WEIGHT: 191 LBS | SYSTOLIC BLOOD PRESSURE: 110 MMHG | BODY MASS INDEX: 29.98 KG/M2 | DIASTOLIC BLOOD PRESSURE: 62 MMHG | OXYGEN SATURATION: 98 %

## 2024-11-18 DIAGNOSIS — Z00.00 PREVENTATIVE HEALTH CARE: Primary | ICD-10-CM

## 2024-11-18 DIAGNOSIS — H57.89 EYE SWELLING: ICD-10-CM

## 2024-11-18 DIAGNOSIS — Z12.11 SCREEN FOR COLON CANCER: ICD-10-CM

## 2024-11-18 DIAGNOSIS — L91.8 SKIN TAG: ICD-10-CM

## 2024-11-18 DIAGNOSIS — R73.09 ELEVATED GLUCOSE: ICD-10-CM

## 2024-11-18 RX ORDER — ERYTHROMYCIN 5 MG/G
OINTMENT OPHTHALMIC
COMMUNITY
Start: 2024-09-25

## 2024-11-18 NOTE — PROGRESS NOTES
[facility] part of [facility]  Return Patient Progress Note      HPI:     Chief Complaint   Patient presents with    Eye Problem     swelling under the right eye lid and  skin tags       Katia Emanuel is a 44 year old female presenting for:    Has a significant  has a past medical history of Anemia, Back problem, History of blood transfusion (12/2022), Hypothyroidism, and Iron (Fe) deficiency anemia.    45yo F with no significant PMH presents with:  - Eye swelling (worsening over past weeks)  - Periocular hyperpigmentation  - Skin tags    Previously seen at urgent care, prescribed erythromycin eye drops without improvement. Seeking further evaluation and treatment.    Labs:   CMP:  Lab Results   Component Value Date/Time     09/07/2022 12:16 PM    K 4.0 09/07/2022 12:16 PM     09/07/2022 12:16 PM    CO2 19.0 (L) 09/07/2022 12:16 PM    CREATSERUM 0.63 09/07/2022 12:16 PM    CA 9.3 09/07/2022 12:16 PM    GLU 66 (L) 09/07/2022 12:16 PM    TP 7.7 09/07/2022 12:16 PM    ALB 4.1 09/07/2022 12:16 PM    ALKPHO 86 09/07/2022 12:16 PM    AST 18 09/07/2022 12:16 PM    ALT 27 09/07/2022 12:16 PM    BILT 0.4 09/07/2022 12:16 PM        CBC:  Lab Results   Component Value Date    WBC 6.6 04/12/2024    HGB 10.5 (L) 04/12/2024    HCT 34.3 (L) 04/12/2024    .0 04/12/2024    NEPERCENT 62.4 02/21/2024    LYPERCENT 26.8 02/21/2024    MOPERCENT 8.0 02/21/2024    EOPERCENT 1.4 02/21/2024    BAPERCENT 1.1 02/21/2024    NE 4.37 02/21/2024    LYMABS 1.88 02/21/2024    MOABSO 0.56 02/21/2024    EOABSO 0.10 02/21/2024    BAABSO 0.08 02/21/2024          Hemoglobin A1C, Microalbumin  Lab Results   Component Value Date/Time    A1C 5.4 09/07/2022 12:16 PM        Lipid panel  Lab Results   Component Value Date/Time    CHOLEST 195 09/07/2022 12:16 PM    HDL 52 09/07/2022 12:16 PM    TRIG 95 09/07/2022 12:16 PM     (H) 09/07/2022 12:16 PM    NONHDLC 143 (H) 09/07/2022 12:16 PM        Medications:  Current  Outpatient Medications   Medication Sig Dispense Refill    erythromycin 5 MG/GM Ophthalmic Ointment       polyethylene glycol, PEG 3350, 17 g Oral Powd Pack Take 17 g by mouth daily. 20 each 1    ferrous sulfate 325 (65 FE) MG Oral Tab EC Take 1 tablet (325 mg total) by mouth daily with breakfast.        PMH:  Past Medical History:    Anemia    Back problem    scoliosis    History of blood transfusion    Hypothyroidism    age [age] w/ meds during this time; had radiation txt and benign biopsy of nodules.     Iron (Fe) deficiency anemia               REVIEW OF SYSTEMS:   Review of Systems   Constitutional:  Negative for chills, fatigue, fever and unexpected weight change.   HENT:  Negative for congestion, ear pain, hearing loss, rhinorrhea, sinus pain and sore throat.         See HPI   Eyes:  Negative for pain, redness and visual disturbance.   Respiratory:  Negative for apnea, cough, chest tightness, shortness of breath and wheezing.    Cardiovascular:  Negative for chest pain, palpitations and leg swelling.   Gastrointestinal:  Negative for abdominal distention, abdominal pain, blood in stool, constipation and nausea.   Endocrine: Negative for cold intolerance, heat intolerance and polyuria.   Genitourinary:  Negative for dysuria, hematuria and urgency.   Musculoskeletal:  Negative for arthralgias, back pain, gait problem, joint swelling, myalgias and neck pain.   Skin:  Negative for rash and wound.   Allergic/Immunologic: Negative for food allergies and immunocompromised state.   Neurological:  Negative for dizziness, seizures, facial asymmetry, speech difficulty, weakness, light-headedness, numbness and headaches.   Hematological:  Negative for adenopathy. Does not bruise/bleed easily.   Psychiatric/Behavioral:  Negative for behavioral problems, sleep disturbance and suicidal ideas. The patient is not nervous/anxious.             PHYSICAL EXAM:   /62   Pulse 78   Ht 5' 7\" (1.702 m)   Wt 191 lb (86.6 kg)    LMP 03/28/2024 (Exact Date)   SpO2 98%   BMI 29.91 kg/m²  Estimated body mass index is 29.91 kg/m² as calculated from the following:    Height as of this encounter: 5' 7\" (1.702 m).    Weight as of this encounter: 191 lb (86.6 kg).     Wt Readings from Last 3 Encounters:   11/18/24 191 lb (86.6 kg)   05/28/24 188 lb 3.2 oz (85.4 kg)   05/01/24 187 lb 9.6 oz (85.1 kg)       Physical Exam  Vitals reviewed.   Constitutional:       General: She is not in acute distress.     Appearance: She is well-developed.   HENT:      Head: Normocephalic and atraumatic.   Eyes:      Conjunctiva/sclera: Conjunctivae normal.      Pupils: Pupils are equal, round, and reactive to light.   Neck:      Thyroid: No thyromegaly.   Cardiovascular:      Rate and Rhythm: Normal rate and regular rhythm.      Heart sounds: Normal heart sounds, S1 normal and S2 normal. No murmur heard.     No friction rub. No gallop.   Pulmonary:      Effort: Pulmonary effort is normal. No respiratory distress.      Breath sounds: Normal breath sounds. No wheezing or rales.   Chest:      Chest wall: No tenderness.   Abdominal:      General: Bowel sounds are normal. There is no distension.      Palpations: Abdomen is soft. There is no mass.      Tenderness: There is no abdominal tenderness. There is no guarding or rebound.   Musculoskeletal:         General: No tenderness. Normal range of motion.      Cervical back: Normal range of motion.   Lymphadenopathy:      Cervical: No cervical adenopathy.   Skin:     General: Skin is warm.      Findings: No erythema or rash.   Neurological:      Mental Status: She is alert and oriented to person, place, and time.      Cranial Nerves: No cranial nerve deficit.      Deep Tendon Reflexes: Reflexes are normal and symmetric.   Psychiatric:         Behavior: Behavior normal.         Thought Content: Thought content normal.         Judgment: Judgment normal.               ASSESSMENT AND PLAN:   Patient is a 44 year old female  who presents primarily presents for:    A&P:    1. Annual/Preventative Health:     - D/w age-appropriate screening    2. Eye Swelling and Hyperpigmentation:     - Worsening over past weeks, unresponsive to Rx erythromycin drops from urgent care     - Plan: Further evaluation and treatment discussion    3. Skin Tags around Eyes:     - Plan: Referral to dermatology for evaluation and possible removal    4. Elevated Glucose:     - Unable to assess due to lack of information     - Plan: Obtain labs to evaluate glucose levels, f/u as needed    5. Colon Cancer Screening:     - D/w importance     - Plan: Provided telephone screening order, f/u appointment in 6 months    CANCELED: Derm Referral - In Network                Health Maintenance:    Health Maintenance   Topic Date Due    Colorectal Cancer Screening  Never done    Annual Physical  09/07/2023    COVID-19 Vaccine (3 - 2024-25 season) 09/01/2024    Mammogram  03/21/2025    Pap Smear  09/07/2025    DTaP,Tdap,and Td Vaccines (2 - Td or Tdap) 03/30/2032    Influenza Vaccine  Completed    Annual Depression Screening  Completed    Pneumococcal Vaccine: Birth to 64yrs  Aged Out         Meds & Refills for this Visit:  Requested Prescriptions      No prescriptions requested or ordered in this encounter       Orders Placed This Encounter   Procedures    Hemoglobin A1C (Glycohemoglobin) [E]    TSH W Reflex To Free T4 [E]    CBC With Differential With Platelet    Comp Metabolic Panel (14) [E]    Lipid Panel    Fluzone trivalent vaccine, PF 0.5mL, 6mo+ (84753)       Imaging & Consults:  INFLUENZA VACCINE, TRI, PRESERV FREE, 0.5 ML  DERM - INTERNAL  OP REFERRAL TO [facility] TELEPHONE COLON SCREEN          Return in about 6 months (around 5/18/2025) for Symptom monitoring.  Important follow up notes/labs for next visit      Patient indicates understanding of the above recommendations and agrees to the above plan.  Reasurrance and education provided. All questions  answered.    Notified to call with any questions, complications, allergies, or worsening or changing symptoms as well as any side effects or complications from the treatments .  Red flags/ ER precautions discussed.    If diagnostic labs or imaging ordered advised patient to contact my office for results  24-48 hours after completion    This note was dictated using dragon speech recognition transcription software.  Typographical and transcription errors may be present.  Please call if any questions.       As part of our commitment to providing you with comprehensive, transparent, and timely access to your health information, we adhere to the guidelines set forth by the 21st Century Cures Act. This Act enhances your rights to access your electronic health information and ensures that you can easily obtain your medical records.  Please note that the verbage used in this note is intended for medical documentation and communication and may be interpreted as forthright.  Please do not hesitate to contact my office if you have any questions.            Rik Fenton MD  [facility]

## 2025-07-02 ENCOUNTER — PATIENT MESSAGE (OUTPATIENT)
Age: 45
End: 2025-07-02

## 2025-07-02 ENCOUNTER — ORDER TRANSCRIPTION (OUTPATIENT)
Dept: ADMINISTRATIVE | Facility: HOSPITAL | Age: 45
End: 2025-07-02

## 2025-07-02 DIAGNOSIS — Z12.31 ENCOUNTER FOR SCREENING MAMMOGRAM FOR MALIGNANT NEOPLASM OF BREAST: Primary | ICD-10-CM

## 2025-07-03 ENCOUNTER — NURSE TRIAGE (OUTPATIENT)
Dept: INTERNAL MEDICINE CLINIC | Facility: CLINIC | Age: 45
End: 2025-07-03

## 2025-07-03 NOTE — TELEPHONE ENCOUNTER
Left a message on [place]'s voice mail to call the office.       Triage symptoms and inquire if patient needs to be evaluated prior to diagnostic mammogram?

## 2025-07-03 NOTE — TELEPHONE ENCOUNTER
Left a specific message on denitrified voice mail to notify OB/GYN Dr. Osorio regarding breast symptoms.

## 2025-07-03 NOTE — TELEPHONE ENCOUNTER
Spoke with Katia who states she found a lump in left breast on 6/30/25. She described this lump as pea size and painful 7/10 in upper breast. Patient denied redness, swelling, or nipple discharge.     Patient requesting order for diagnostic mammogram.     Does Katia need an appointment for evaluation prior to diagnostic mammogram?    Please advise.       Reason for Disposition   Breast lump    Answer Assessment - Initial Assessment Questions  De1. SYMPTOM: \"What's the main symptom you're concerned about?\"  (e.g., lump, pain, rash, nipple discharge)      Pea size lump, no redness, rash or nipple discharge.  2. LOCATION: \"Where is the left located?\"      Left  3. ONSET: \"When did 6/30/25  start?\"      6/30/25  4. PRIOR HISTORY: \"Do you have any history of prior problems with your breasts?\" (e.g., lumps, cancer, fibrocystic breast disease)      Denied  5. CAUSE: \"What do you think is causing this symptom?\"      Unknown  6. OTHER SYMPTOMS: \"Do you have any other symptoms?\" (e.g., fever, breast pain, redness or rash, nipple discharge)      7/10 pain  7. PREGNANCY-BREASTFEEDING: \"Is there any chance you are pregnant?\" \"When was your last menstrual period?\" \"Are you breastfeeding?\"      Denied, LMP 6/30/25    Protocols used: Breast Symptoms-A-[place]

## 2025-07-09 ENCOUNTER — OFFICE VISIT (OUTPATIENT)
Dept: OBGYN CLINIC | Facility: CLINIC | Age: 45
End: 2025-07-09
Payer: COMMERCIAL

## 2025-07-09 VITALS
DIASTOLIC BLOOD PRESSURE: 72 MMHG | BODY MASS INDEX: 29.85 KG/M2 | HEIGHT: 67 IN | SYSTOLIC BLOOD PRESSURE: 108 MMHG | WEIGHT: 190.19 LBS

## 2025-07-09 DIAGNOSIS — N63.20 MASS OF LEFT BREAST, UNSPECIFIED QUADRANT: Primary | ICD-10-CM

## 2025-07-09 PROCEDURE — 99214 OFFICE O/P EST MOD 30 MIN: CPT | Performed by: OBSTETRICS & GYNECOLOGY

## 2025-07-09 NOTE — PROGRESS NOTES
[facility]  Obstetrics and Gynecology   Follow Up    Subjective:     Katia Emanuel is a 45 year old  female who presents with c/o   Chief Complaint   Patient presents with    Breast Lump     Pt states she found a lump on her left breast, it went down in size and not as painful as it was       The patient reports new breast lump in the upper outer quadrant of her left breast. Noted it in the last 1-2 weeks. No other breast symptoms bilaterally.     Review of Systems:  General: no complaints  Eyes: no complaints  Respiratory: no complaints  Cardiovascular: no complaints  GI: no complaints  : Improved menses with only 1-2 days of monthly bleeding.   Hematological/lymphatic: no complaints  Breast: See HPI.   Psychiatric: no complaints  Endocrine:no complaints  Neurological: no complaints  Immunological: no complaints  Musculoskeletal:no complaints    HISTORY:  Past Medical History[1]   Past Surgical History[2]   Family History[3]   Short Social Hx on File[4]        Objective:     Vitals:    25 1333   BP: 108/72   Weight: 190 lb 3.2 oz (86.3 kg)   Height: 67\"         Body mass index is 29.79 kg/m².    Exam with ANNELISE Nioxn present:   GENERAL: well developed, well nourished, in no apparent distress, alert and orientated X 3  PSYCH: mood and affect stable   SKIN: no rashes, no lesions  LUNGS: respiration unlabored  CARDIOVASCULAR: no peripheral edema or varicosities, skin warm and dry  BREASTS: bilaterally nontender, no palpable masses in the right breast, in the left breast upper outer quadrant there is a round 2-3 cm palpable lump that is mobile without skin changes, no nipple discharge bilaterally,  no skin changes bilaterally, no axillary adenopathy bilaterally.   EXTREMITIES:  Normal range of motion, strength 5/5 walking.     Labs:  Reviewed last pap 2022 NILM HPV negative.     Imaging:  Mammogram normal .        Assessment:     Katia Emanuel is  a 45 year old  female who presents for Left breast lump    Problem List[5]      Plan:     1. Mass of left breast, unspecified quadrant  - Left breast upper outer quadrant.   - Due for mammogram thus ordered bilateral mammogram.   - [facility] MYRNA 2D+3D DIAGNOSTIC [facility]  BILAT (CPT=77066/27763); Future     All of the findings and plan were discussed with the patient.  She notes understanding and agrees with the plan of care.  All questions were answered to the best of my ability at this time.    RTC 2025 or later for Annual with pap or sooner if needed    Dr. Niraj Osorio MD    [facility]     This note was created by [employer]book voice recognition. Errors in content may be related to improper recognition by the system; efforts to review and correct have been done but errors may still exist. Please be advised the primary purpose of this note is for me to communicate medical care. Standard sentence structure is not always used. Medical terminology and medical abbreviations may be used. There may be grammatical, typographical, and automated fill ins that may have errors missed in proofreading.      Total patient time was 30 minutes in precharting, evaluation and management.          [1]   Past Medical History:   Abnormal uterine bleeding    Anemia    Back problem    scoliosis    Decorative tattoo    Dysmenorrhea    History of blood transfusion    Hypothyroidism    age [age] w/ meds during this time; had radiation txt and benign biopsy of nodules.     Iron (Fe) deficiency anemia   [2]   Past Surgical History:  Procedure Laterality Date      2007    Cholecystectomy      Colonoscopy  2015    Laparoscopic cholecystectomy      Removal gallbladder      Tubal ligation     [3]   Family History  Problem Relation Age of Onset    Diabetes Father     Prostate Cancer Father         colon cancer    Cancer Father     Diabetes Sister     Other (Cervical Cancer) Sister         cervical cancer   [4]   Social  History  Socioeconomic History    Marital status:    Tobacco Use    Smoking status: Never    Smokeless tobacco: Never   Vaping Use    Vaping status: Never Used   Substance and Sexual Activity    Alcohol use: No    Drug use: Never    Sexual activity: Yes     Partners: Male   Other Topics Concern    Right Handed Yes    Blood Transfusions Yes   Social History Narrative    Feels safe   [5]   Patient Active Problem List  Diagnosis    Chronic bilateral low back pain without sciatica    Acute on chronic anemia    Endometrial polyp    Abnormal uterine bleeding (AUB)

## (undated) DIAGNOSIS — T20.20XA BURN OF FACE, SECOND DEGREE, INITIAL ENCOUNTER: Primary | ICD-10-CM

## (undated) DIAGNOSIS — T24.202A BURN OF LEFT LEG, SECOND DEGREE, INITIAL ENCOUNTER: ICD-10-CM

## (undated) DEVICE — GLOVE SUR 7.5 SENSICARE PI MIC PIP CRM PWD F

## (undated) DEVICE — 1016 S-DRAPE IRRIG POUCH 10/BOX: Brand: STERI-DRAPE™

## (undated) DEVICE — SOLUTION IRRIG 3000ML 0.9% NACL FLX CONT

## (undated) DEVICE — HYSTEROSCOPY: Brand: MEDLINE INDUSTRIES, INC.

## (undated) DEVICE — SOFT TISSUE SHAVER MINI: Brand: TRUCLEAR

## (undated) DEVICE — KIT HYSTEROSCOPIC PROC INCL INFLO OUTFLO TB

## (undated) DEVICE — HANDPIECE ABLAT DIA6MM ENDOMET IMPED CTRL DEV

## (undated) DEVICE — CANISTER SUCT 3000CC HI FLO DISP FOR FLD MGMT

## (undated) NOTE — ED AVS SNAPSHOT
Audrie Crigler   MRN: [id number]    Department:  [facility] Emergency Department   Date of Visit:  11/27/2017           Disclosure     Insurance plans vary and the physician(s) referred by the ER may not be covered by your plan.  Please contact CARE PHYSICIAN AT ONCE OR RETURN IMMEDIATELY TO THE EMERGENCY DEPARTMENT. If you have been prescribed any medication(s), please fill your prescription right away and begin taking the medication(s) as directed.   If you believe that any of the medications

## (undated) NOTE — LETTER
[address] [facility] [address] [facility]  [address] [facility] [address] [facility] [address]  [phone]            June 13, 2019      [facility] [place]  [address] [address]      Dear Emilie De La Cruz:     Our office has been trying

## (undated) NOTE — LETTER
Date: 10/8/2020    Patient Name: Jovany Romano  : 3/14/1980        To Whom It May Concern: This letter has been written at the patient's request. The above patient was seen at the [facility] for treatment of a medical condition.     Kraig Harmon

## (undated) NOTE — LETTER
Date & Time: 3/30/2022, 2:31 PM  Patient: Maynor Long  Encounter Provider(s):    El Barcenas MD       To Whom It May Concern:    Maynor Long was seen and treated in our department on 3/30/2022. She should not return to work until 4/4/22.     If you have any questions or concerns, please do not hesitate to call.        _____________________________  Physician/APC Signature

## (undated) NOTE — LETTER
Dr. Bree Anderson MD     [address] [place] [address]  [address] [facility]  [facility] [address]  [phone]     June 13, 2019      [place]  [address] [address]      Dear

## (undated) NOTE — LETTER
February 2, 2018    Patient: Charis Fulton   Date of Visit: 2/2/2018       To Whom It May Concern:    Charis Fulton was seen and treated in our emergency department on 2/2/2018. She may return to work on 2/3/2018.     If you have any questions or co

## (undated) NOTE — LETTER
November 27, 2017    Patient: Ge Torres   Date of Visit: 11/27/2017       To Whom It May Concern:    Ge Torres was seen and treated in our emergency department on 11/27/2017.     If you have any questions or concerns, please don't hesitate t
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
## (undated) NOTE — ED AVS SNAPSHOT
Ge Torres   MRN: [id number]    Department:  [facility] Emergency Department   Date of Visit:  2/2/2018           Disclosure     Insurance plans vary and the physician(s) referred by the ER may not be covered by your plan.  Please contact y CARE PHYSICIAN AT ONCE OR RETURN IMMEDIATELY TO THE EMERGENCY DEPARTMENT. If you have been prescribed any medication(s), please fill your prescription right away and begin taking the medication(s) as directed.   If you believe that any of the medications

## (undated) NOTE — LETTER
Date & Time: 4/20/2018, 8:23 AM  Patient: Keshia Christina  Encounter Provider(s):    Mirela Butler MD       To Whom It May Concern:    Keshia Christina was seen and treated in our department on 4/20/2018.     If you have any questions or concerns, sulaiman

## (undated) NOTE — ED AVS SNAPSHOT
Katina Rushing   MRN: [id number]    Department:  [facility] Emergency Department   Date of Visit:  4/20/2018           Disclosure     Insurance plans vary and the physician(s) referred by the ER may not be covered by your plan.  Please contact CARE PHYSICIAN AT ONCE OR RETURN IMMEDIATELY TO THE EMERGENCY DEPARTMENT. If you have been prescribed any medication(s), please fill your prescription right away and begin taking the medication(s) as directed.   If you believe that any of the medications

## (undated) NOTE — LETTER
Radha Dub 35   Date:   7/19/2019     Name:   Lee Flores    YOB: 1980   MRN:   [id number]       WHERE IS YOUR PAIN NOW? Carlos the areas on your body where you feel the described sensations.   Use the appropriate sy

## (undated) NOTE — LETTER
Date & Time: 6/6/2019, 8:12 AM  Patient: Ezekiel Lyon  Encounter Provider(s):    Henok Anderson MD       To Whom It May Concern:    Ezekiel Lyon was seen and treated in our department on 6/6/2019. She can return to work.     If you have any ques

## (undated) NOTE — LETTER
November 5, 2020      [address] [place] [address]      Dear Nadyne Closs: Our office has been trying to contact you to discuss your recent test results. Please contact our office at your earliest convenience at [phone].

## (undated) NOTE — LETTER
Date: 5/28/2024    Patient Name: Katia Emanuel          To Whom it may concern:    This letter has been written at the patient's request. The above patient was seen at [facility] for treatment of a medical condition.    This patient should be excused from attending work today 5/28/24.    If you have any questions please contact my office.          Sincerely,            Rik Fenton MD